# Patient Record
Sex: FEMALE | Race: WHITE | NOT HISPANIC OR LATINO | Employment: STUDENT | URBAN - METROPOLITAN AREA
[De-identification: names, ages, dates, MRNs, and addresses within clinical notes are randomized per-mention and may not be internally consistent; named-entity substitution may affect disease eponyms.]

---

## 2018-03-23 ENCOUNTER — OFFICE VISIT (OUTPATIENT)
Dept: MIDWIFE SERVICES | Facility: CLINIC | Age: 18
End: 2018-03-23
Payer: COMMERCIAL

## 2018-03-23 VITALS
DIASTOLIC BLOOD PRESSURE: 75 MMHG | SYSTOLIC BLOOD PRESSURE: 123 MMHG | HEIGHT: 70 IN | HEART RATE: 68 BPM | OXYGEN SATURATION: 95 % | BODY MASS INDEX: 23.72 KG/M2 | WEIGHT: 165.7 LBS

## 2018-03-23 DIAGNOSIS — N76.0 BV (BACTERIAL VAGINOSIS): ICD-10-CM

## 2018-03-23 DIAGNOSIS — Z11.3 SCREEN FOR STD (SEXUALLY TRANSMITTED DISEASE): Primary | ICD-10-CM

## 2018-03-23 DIAGNOSIS — B96.89 BV (BACTERIAL VAGINOSIS): ICD-10-CM

## 2018-03-23 LAB
SPECIMEN SOURCE: ABNORMAL
WET PREP SPEC: ABNORMAL

## 2018-03-23 PROCEDURE — 87210 SMEAR WET MOUNT SALINE/INK: CPT | Performed by: ADVANCED PRACTICE MIDWIFE

## 2018-03-23 PROCEDURE — 87591 N.GONORRHOEAE DNA AMP PROB: CPT | Performed by: ADVANCED PRACTICE MIDWIFE

## 2018-03-23 PROCEDURE — 99201 ZZC OFFICE/OUTPT VISIT, NEW, LEVEL I: CPT | Performed by: ADVANCED PRACTICE MIDWIFE

## 2018-03-23 PROCEDURE — 87491 CHLMYD TRACH DNA AMP PROBE: CPT | Performed by: ADVANCED PRACTICE MIDWIFE

## 2018-03-23 RX ORDER — METRONIDAZOLE 500 MG/1
500 TABLET ORAL 2 TIMES DAILY
Qty: 14 TABLET | Refills: 0 | Status: SHIPPED | OUTPATIENT
Start: 2018-03-23 | End: 2019-01-17

## 2018-03-23 NOTE — MR AVS SNAPSHOT
"              After Visit Summary   3/23/2018    Taryn Good    MRN: [id number]           Patient Information     Date Of Birth          2000        Visit Information        Provider Department      3/23/2018 3:00 PM Shelly Henderson APRN CNM [facility]        Today's Diagnoses     Screen for STD (sexually transmitted disease)    -  1       Follow-ups after your visit        Who to contact     If you have questions or need follow up information about today's clinic visit or your schedule please contact [facility] directly at [phone].  Normal or non-critical lab and imaging results will be communicated to you by ZipListhart, letter or phone within 4 business days after the clinic has received the results. If you do not hear from us within 7 days, please contact the clinic through ZipListhart or phone. If you have a critical or abnormal lab result, we will notify you by phone as soon as possible.  Submit refill requests through [employer] or call your pharmacy and they will forward the refill request to us. Please allow 3 business days for your refill to be completed.          Additional Information About Your Visit        MyChart Information     [employer] lets you send messages to your doctor, view your test results, renew your prescriptions, schedule appointments and more. To sign up, go to [email or web address] . Click on \"Log in\" on the left side of the screen, which will take you to the Welcome page. Then click on \"Sign up Now\" on the right side of the page.     You will be asked to enter the access code listed below, as well as some personal information. Please follow the directions to create your username and password.     Your access code is: [email or web address]  Expires: 2018  3:53 PM     Your access code will  in 90 days. If you need help or a new code, please call your [facility] or [phone].        Care EveryWhere ID     This is your Care EveryWhere ID. " "This could be used by other organizations to access your [place] medical records  [id number]        Your Vitals Were     Pulse Height Last Period Pulse Oximetry BMI (Body Mass Index)       68 5' 10\" (1.778 m) 02/15/2018 95% 23.78 kg/m2        Blood Pressure from Last 3 Encounters:   03/23/18 123/75    Weight from Last 3 Encounters:   03/23/18 165 lb 11.2 oz (75.2 kg) (90 %)*     * Growth percentiles are based on CDC 2-20 Years data.              We Performed the Following     CHLAMYDIA TRACHOMATIS PCR     NEISSERIA GONORRHOEA PCR     Wet prep        Primary Care Provider Fax #    Physician No Ref-Primary [phone]       No address on file        Equal Access to Services     EMIR GALLARDO : Leyla Jarvis, barbara mena, qaearnest kaalmakayla bain, genaro craig . So [facility] [phone].    ATENCIÓN: Si habla español, tiene a biggs disposición servicios gratuitos de asistencia lingüística. Llame al [phone].    We comply with applicable federal civil rights laws and [place] laws. We do not discriminate on the basis of race, color, national origin, age, disability, sex, sexual orientation, or gender identity.            Thank you!     Thank you for choosing [facility]  for your care. Our goal is always to provide you with excellent care. Hearing back from our patients is one way we can continue to improve our services. Please take a few minutes to complete the written survey that you may receive in the mail after your visit with us. Thank you!             Your Updated Medication List - Protect others around you: Learn how to safely use, store and throw away your medicines at www.disposemymeds.org.          This list is accurate as of 3/23/18  3:53 PM.  Always use your most recent med list.                   Brand Name Dispense Instructions for use Diagnosis    norgestrel-ethinyl estradiol 0.3-30 MG-MCG per tablet    LO/OVRAL     Take 1 tablet by mouth " daily

## 2018-03-23 NOTE — PROGRESS NOTES
"S:  Taryn Good is a 17 year old who presents today for sexually transmitted disease testing. She has no known exposures, just wants testing. Wondering if blood work is covered by insurance, advised to call them to be sure. She declines blood testing today. She would like gonorrhea and chlamydia testing and trichomonas testing. She needed to leave so visit was very short, declined needing anything else or having any other questions. She is currently using pills for birth control and up to date with those.     O:  /75  Pulse 68  Ht 5' 10\" (1.778 m)  Wt 165 lb 11.2 oz (75.2 kg)  LMP 02/15/2018  SpO2 95%  BMI 23.78 kg/m2  Patient is well  Gonorrhea and chlamydia collected via urine   Wet prep was self collect.     A:  Sexually transmitted disease testing, no known exposure     P:  Patient would like results via phone call whether positive or negative. Declined registering for mychart today   Follow up PRN.     Shelly Henderson CNM   "

## 2018-03-23 NOTE — NURSING NOTE
"Chief Complaint   Patient presents with     New Patient     STD       Initial /75  Pulse 68  Ht 5' 10\" (1.778 m)  Wt 165 lb 11.2 oz (75.2 kg)  LMP 02/15/2018  SpO2 95%  BMI 23.78 kg/m2 Estimated body mass index is 23.78 kg/(m^2) as calculated from the following:    Height as of this encounter: 5' 10\" (1.778 m).    Weight as of this encounter: 165 lb 11.2 oz (75.2 kg).  BP completed using cuff size: regular        The following  Due: Chari (13-24)      The following patient reported/Care Every where data was sent to:  P ABSTRACT QUALITY INITIATIVES [75575]  na      patient has appointment for today and orders have been placed              "

## 2018-03-25 LAB
C TRACH DNA SPEC QL NAA+PROBE: NEGATIVE
N GONORRHOEA DNA SPEC QL NAA+PROBE: NEGATIVE
SPECIMEN SOURCE: NORMAL
SPECIMEN SOURCE: NORMAL

## 2018-05-10 NOTE — PROGRESS NOTES
"Taryn is a 17 year old female  that presents today to establish care and discuss her mood swings:   HPI:  History of depression, anxiety and substance abuse:   - one year ago went off Zoloft after being sober for 8 months.  Now over the past three months she has been have severe mood swings where one moment she feels fine and the next moment she is more anxiety, anger and depressed.  She reports feeling scared of her emotions.  One day did feel so down that she felt \"hopeless and life was not worth living.\"  However no suicidal plan or ongoing thoughts of suicide.   PHQ9=13;  GAD7=9;   - Lots of trouble falling asleep. Melatonin has not worked in the past   - Has been sober from ETOH and Mariajuana for two years [went through [facility] program for drug and ETOH  - Has good coping skills and exercises regularly  - Not seeing a therapist  - Goes to meetings but no mentor [she relapsed]  - Best friend overdosed and  [2017]  - First year at [employer] academically has done well.  Loves the distraction of school.   HX: Depression started in 7th grade.Anxiety most of her life. Started on SSRI in 8th grade. Started using ETOH and mariajuana at about the same time.  No suicidal attempts and no psych hospitalizations.   ROS:  General: difficulty sleeping and mood swings   Head/Eyes: none  Ears/Nose/Throat: none  Cardiovascular: none  Respiratory: none  Gastrointestinal: none  Breast: none  Genitourinary: none  Sexual Function: none  Musculoskeletal: none  Skin: under arm sweating   Neurological: none  Mental Health: anxiety, depression, nervousness, mood changes, difficulty sleeping and changes in sleep pattern  Endocrine: none  OB/GYN HISTORY:   On OCP's  Obstetric History       T0      L0     SAB0   TAB0   Ectopic0   Multiple0   Live Births0       PAST MEDICAL HISTORY:  No past medical history on file.  Life Style Modifiers:   Tobacco:  reports that she has been smoking Cigarettes.  She uses " "smokeless tobacco.   Alcohol:  reports that she does not drink alcohol.   Drug use:  reports that she does not use illicit drugs.  Exercise: 5-6 times a week.                   Diet: good  PAST SURGICAL HISTORY:  No past surgical history on file.  FAMILY HISTORY:  Family History   Problem Relation Age of Onset     Depression Mother      Migraines Mother    SOCIAL HISTORY:  Came to [place] in 2016 [from [place]] to go to [employer] [drug and alcohol]. Sober for two years. Moved into a sober house initally and now lives in a dorm.  Close relationship with parents.  Would call them if she needed help.   Social History     Social History     Marital status: Single     Spouse name: N/A     Number of children: N/A     Years of education: N/A     Occupational History     Not on file.     Social History Main Topics     Smoking status: Current Every Day Smoker     Types: Cigarettes     Smokeless tobacco: Current User     Alcohol use No     Drug use: No     Sexual activity: Yes     Partners: Male     Birth control/ protection: Pill   EDICATIONS:  Current Outpatient Prescriptions   Medication Sig Dispense Refill     norgestrel-ethinyl estradiol (LO/OVRAL) 0.3-30 MG-MCG per tablet Take 1 tablet by mouth daily       metroNIDAZOLE (FLAGYL) 500 MG tablet Take 1 tablet (500 mg) by mouth 2 times daily (Patient not taking: Reported on 5/11/2018) 14 tablet 0   ALLERGIES:  Review of patient's allergies indicates no known allergies.  VITALS:  /73  Pulse 67  Temp 98  F (36.7  C) (Oral)  Ht 5' 9.33\" (176.1 cm)  Wt 158 lb (71.7 kg)  LMP 04/29/2018  SpO2 97%  Breastfeeding? No  BMI 23.11 kg/m2  PHYSICAL EXAM:  Well appearing woman in NAD  Speech: regular rate and rhythm  Mood: depressed and tearful at times  Affect: normal  Thought process: appropriate  Psych motor changes: normal/none  Judgement: good  Insight: good   Total of 40 minutes was spent in direct contact with the patient and >50% of the time in patient " education and coordination of care.  Diagnoses and associated orders for this visit:  Moderate episode of recurrent major depressive disorder (H) with mood swings  Hx of drug and ETOH abuse: Sober for two years     Advised a therapist and find a new mentor.  Continue with AA group    Advised that she restart medication: Zoloft 50 mg initially then increase to 100 mg in two weeks [she was one 200 mg in the past]    Follow up in one month or sooner if needed     Discussed plan if depressive symptoms increased [help line and [facility] at [place]]    Mag calm or relaxing tea with good sleep hygiene

## 2018-05-11 ENCOUNTER — OFFICE VISIT (OUTPATIENT)
Dept: FAMILY MEDICINE | Facility: CLINIC | Age: 18
End: 2018-05-11
Payer: COMMERCIAL

## 2018-05-11 ENCOUNTER — TELEPHONE (OUTPATIENT)
Dept: FAMILY MEDICINE | Facility: CLINIC | Age: 18
End: 2018-05-11

## 2018-05-11 VITALS
TEMPERATURE: 98 F | BODY MASS INDEX: 23.4 KG/M2 | DIASTOLIC BLOOD PRESSURE: 73 MMHG | HEART RATE: 67 BPM | SYSTOLIC BLOOD PRESSURE: 117 MMHG | OXYGEN SATURATION: 97 % | HEIGHT: 69 IN | WEIGHT: 158 LBS

## 2018-05-11 DIAGNOSIS — F10.11 H/O ETOH ABUSE: ICD-10-CM

## 2018-05-11 DIAGNOSIS — R61 GENERALIZED HYPERHIDROSIS: ICD-10-CM

## 2018-05-11 DIAGNOSIS — F33.1 MODERATE EPISODE OF RECURRENT MAJOR DEPRESSIVE DISORDER (H): Primary | ICD-10-CM

## 2018-05-11 ASSESSMENT — ANXIETY QUESTIONNAIRES
GAD7 TOTAL SCORE: 9
6. BECOMING EASILY ANNOYED OR IRRITABLE: MORE THAN HALF THE DAYS
1. FEELING NERVOUS, ANXIOUS, OR ON EDGE: SEVERAL DAYS
7. FEELING AFRAID AS IF SOMETHING AWFUL MIGHT HAPPEN: NOT AT ALL
5. BEING SO RESTLESS THAT IT IS HARD TO SIT STILL: SEVERAL DAYS
IF YOU CHECKED OFF ANY PROBLEMS ON THIS QUESTIONNAIRE, HOW DIFFICULT HAVE THESE PROBLEMS MADE IT FOR YOU TO DO YOUR WORK, TAKE CARE OF THINGS AT HOME, OR GET ALONG WITH OTHER PEOPLE: SOMEWHAT DIFFICULT
3. WORRYING TOO MUCH ABOUT DIFFERENT THINGS: MORE THAN HALF THE DAYS
2. NOT BEING ABLE TO STOP OR CONTROL WORRYING: MORE THAN HALF THE DAYS

## 2018-05-11 ASSESSMENT — PATIENT HEALTH QUESTIONNAIRE - PHQ9: 5. POOR APPETITE OR OVEREATING: SEVERAL DAYS

## 2018-05-11 NOTE — NURSING NOTE
"17 year old  Chief Complaint   Patient presents with     Establish Care     pt wants to discuss anxiety and depression       Blood pressure 117/73, pulse 67, temperature 98  F (36.7  C), temperature source Oral, height 5' 9.33\" (176.1 cm), weight 158 lb (71.7 kg), last menstrual period 04/29/2018, SpO2 97 %, not currently breastfeeding. Body mass index is 23.11 kg/(m^2).  There is no problem list on file for this patient.      Wt Readings from Last 2 Encounters:   05/11/18 158 lb (71.7 kg) (86 %)*   03/23/18 165 lb 11.2 oz (75.2 kg) (90 %)*     * Growth percentiles are based on CDC 2-20 Years data.     BP Readings from Last 3 Encounters:   05/11/18 117/73   03/23/18 123/75         Current Outpatient Prescriptions   Medication     norgestrel-ethinyl estradiol (LO/OVRAL) 0.3-30 MG-MCG per tablet     metroNIDAZOLE (FLAGYL) 500 MG tablet     No current facility-administered medications for this visit.        Social History   Substance Use Topics     Smoking status: Current Every Day Smoker     Types: Cigarettes     Smokeless tobacco: Current User     Alcohol use No       Health Maintenance Due   Topic Date Due     PEDS DTAP/TDAP (1 - Tdap) 05/19/2005     HPV IMMUNIZATION (1 of 3 - Female 3 Dose Series) 05/19/2009     TETANUS IMMUNIZATION (SYSTEM ASSIGNED)  05/19/2016     HIV SCREEN (SYSTEM ASSIGNED)  05/19/2016       No results found for: PAP      May 11, 2018 8:01 AM  "

## 2018-05-11 NOTE — MR AVS SNAPSHOT
After Visit Summary   2018    Taryn Good    MRN: [id number]           Patient Information     Date Of Birth          2000        Visit Information        Provider Department      2018 8:00 AM Manisha Charlton MD [facility]        Today's Diagnoses     Moderate episode of recurrent major depressive disorder (H)    -  1    H/O ETOH abuse        Generalized hyperhidrosis           Follow-ups after your visit        Your next 10 appointments already scheduled     Brian 15, 2018  1:40 PM CDT   Return Visit with Manisha Charlton MD   [facility])    [address]  [facility] [address]   [phone]              Who to contact     Please call your clinic at [phone] to:    Ask questions about your health    Make or cancel appointments    Discuss your medicines    Learn about your test results    Speak to your doctor            Additional Information About Your Visit        MyChart Information     [employer] is an electronic gateway that provides easy, online access to your medical records. With [employer], you can request a clinic appointment, read your test results, renew a prescription or communicate with your care team.     To sign up for [facility] visit the website at [email or web address]   You will be asked to enter the access code listed below, as well as some personal information. Please follow the directions to create your username and password.     Your access code is: [email or web address]  Expires: 2018  3:53 PM     Your access code will  in 90 days. If you need help or a new code, please contact your [facility] or call [phone] for assistance.        Care EveryWhere ID     This is your Care EveryWhere ID. This could be used by other organizations to access your [place] medical records  [id number]        Your Vitals Were     Pulse Temperature Height Last  "Period Pulse Oximetry Breastfeeding?    67 98  F (36.7  C) (Oral) 5' 9.33\" (176.1 cm) 04/29/2018 97% No    BMI (Body Mass Index)                   23.11 kg/m2            Blood Pressure from Last 3 Encounters:   05/11/18 117/73   03/23/18 123/75    Weight from Last 3 Encounters:   05/11/18 158 lb (71.7 kg) (86 %)*   03/23/18 165 lb 11.2 oz (75.2 kg) (90 %)*     * Growth percentiles are based on [facility] 2-20 Years data.              Today, you had the following     No orders found for display         Today's Medication Changes          These changes are accurate as of 5/11/18  8:58 AM.  If you have any questions, ask your nurse or doctor.               Start taking these medicines.        Dose/Directions    aluminum chloride 20 % external solution   Commonly known as:  DRYSOL   Used for:  Generalized hyperhidrosis   Started by:  Manisha Charlton MD        Apply topically At Bedtime To improve effect, cover area of application with plastic wrap,  hold in place with tight shirt, and wash area in morning. As sweating improves, decrease use to 1-2 times weekly.   Quantity:  60 mL   Refills:  1       sertraline 50 MG tablet   Commonly known as:  ZOLOFT   Used for:  Moderate episode of recurrent major depressive disorder (H)   Started by:  Manisha Charlton MD        Start with one tablet for two weeks then increase to 2 tablets = 100 mg   Quantity:  60 tablet   Refills:  1            Where to get your medicines      These medications were sent to [facility] [place], [place] - [address]  [address] [address]     Phone:  [phone]     sertraline 50 MG tablet         Some of these will need a paper prescription and others can be bought over the counter.  Ask your nurse if you have questions.     Bring a paper prescription for each of these medications     aluminum chloride 20 % external solution                Primary Care Provider Fax #    Physician No Ref-Primary " [phone]       No address on file        Equal Access to Services     EMIR PAULINA : Hadii orquidea chavez nader Jarvis, barbara sakshiboston, karma cerna mary janejanet, waxtimur yesseniain hayaatomasa hintonjillian mcginnisramon martinez. So [facility] [phone].    ATENCIÓN: Si habla español, tiene a biggs disposición servicios gratuitos de asistencia lingüística. [facility] [phone].    We comply with applicable federal civil rights laws and [place] laws. We do not discriminate on the basis of race, color, national origin, age, disability, sex, sexual orientation, or gender identity.            Thank you!     Thank you for choosing [facility]  for your care. Our goal is always to provide you with excellent care. Hearing back from our patients is one way we can continue to improve our services. Please take a few minutes to complete the written survey that you may receive in the mail after your visit with us. Thank you!             Your Updated Medication List - Protect others around you: Learn how to safely use, store and throw away your medicines at www.disposemymeds.org.          This list is accurate as of 5/11/18  8:58 AM.  Always use your most recent med list.                   Brand Name Dispense Instructions for use Diagnosis    aluminum chloride 20 % external solution    DRYSOL    60 mL    Apply topically At Bedtime To improve effect, cover area of application with plastic wrap,  hold in place with tight shirt, and wash area in morning. As sweating improves, decrease use to 1-2 times weekly.    Generalized hyperhidrosis       metroNIDAZOLE 500 MG tablet    FLAGYL    14 tablet    Take 1 tablet (500 mg) by mouth 2 times daily    BV (bacterial vaginosis)       norgestrel-ethinyl estradiol 0.3-30 MG-MCG per tablet    LO/OVRAL     Take 1 tablet by mouth daily        sertraline 50 MG tablet    ZOLOFT    60 tablet    Start with one tablet for two weeks then increase to 2 tablets = 100 mg    Moderate episode of recurrent major depressive  disorder (H)

## 2018-05-12 ASSESSMENT — ANXIETY QUESTIONNAIRES: GAD7 TOTAL SCORE: 9

## 2018-05-12 ASSESSMENT — PATIENT HEALTH QUESTIONNAIRE - PHQ9: SUM OF ALL RESPONSES TO PHQ QUESTIONS 1-9: 13

## 2018-06-14 NOTE — PROGRESS NOTES
"Taryn is a 18 year old female  that presents today for follow-up:  Last seen 2018  HPI:  History of depression, anxiety and substance abuse:   - Remain Sober for > two years.  Attends  two meeting a week.   - Restarted on Zoloft one month ago.   Now on Zoloft 100 mg and feels stable    Most of the time \"I feel good/normal\"  \" feel myself\"    Not working with a therapist at this time    Working full time [ at a [employer] - long hours].  Having a schedule helps and she enjoys her job     Lives on [place] [facility] [sober dorm] will return to classes in the fall [sophomore year]    Does worry with the winter/shorter days and how it will affect her mood.   PHQ9=6;  GAD7=1;   HX: Depression started in 7th grade. Anxiety most of her life. Started on SSRI in 8th grade. Started using ETOH and mariajuana at about the same time.  No suicidal attempts and no psych hospitalizations.   ROS:  General: sleep is good.  Feeling stable.    Head/Eyes: none  Ears/Nose/Throat: none  Cardiovascular: none  Respiratory: none  Gastrointestinal: none  Breast: none  Genitourinary: none  Sexual Function: none  Musculoskeletal: none  Skin: under arm sweating   Neurological: none  Mental Health: anxiety, depression, nervousness, mood changes, difficulty sleeping and changes in sleep pattern  Endocrine: none  OB/GYN HISTORY:   On OCP's  Obstetric History       T0      L0     SAB0   TAB0   Ectopic0   Multiple0   Live Births0       PAST MEDICAL HISTORY:  Past Medical History:   Diagnosis Date     Depression      H/O ETOH abuse      Successful prior treatment for illicit drug use      Life Style Modifiers:   Tobacco:  reports that she has been smoking Cigarettes.  She uses smokeless tobacco.   Alcohol:  reports that she does not drink alcohol.   Drug use:  reports that she does not use illicit drugs.  Exercise: 5-6 times a week.                   Diet: good  PAST SURGICAL HISTORY:  No past surgical history on " "file.  FAMILY HISTORY:  Family History   Problem Relation Age of Onset     Depression Mother      Migraines Mother    SOCIAL HISTORY:  Came to [place] in 2016 [from [place]] to go to [employer] program [drug and alcohol]. Sober for + two years.   [facility]  Close relationship with parents.  Would call them if she needed help.   Social History     Social History     Marital status: Single     Spouse name: N/A     Number of children: N/A     Years of education: N/A     Occupational History     Not on file.     Social History Main Topics     Smoking status: Current Every Day Smoker     Types: Cigarettes     Smokeless tobacco: Current User     Alcohol use No     Drug use: No     Sexual activity: Yes     Partners: Male     Birth control/ protection: Pill   EDICATIONS:  Current Outpatient Prescriptions   Medication Sig Dispense Refill     aluminum chloride (DRYSOL) 20 % external solution Apply topically At Bedtime To improve effect, cover area of application with plastic wrap,  hold in place with tight shirt, and wash area in morning. As sweating improves, decrease use to 1-2 times weekly. 60 mL 1     metroNIDAZOLE (FLAGYL) 500 MG tablet Take 1 tablet (500 mg) by mouth 2 times daily (Patient not taking: Reported on 5/11/2018) 14 tablet 0     norgestrel-ethinyl estradiol (LO/OVRAL) 0.3-30 MG-MCG per tablet Take 1 tablet by mouth daily       sertraline (ZOLOFT) 50 MG tablet Start with one tablet for two weeks then increase to 2 tablets = 100 mg 60 tablet 1   ALLERGIES:  Review of patient's allergies indicates no known allergies.  VITALS:  /78 (BP Location: Right arm, Patient Position: Chair, Cuff Size: Adult Regular)  Pulse 82  Temp 98.1  F (36.7  C) (Oral)  Ht 1.758 m (5' 9.21\")  Wt 70.8 kg (156 lb)  SpO2 100%  BMI 22.9 kg/m2  PHYSICAL EXAM:  Well appearing woman in NAD  Speech: regular rate and rhythm  Mood:excellent    Affect: normal  Thought process: " appropriate  Psych motor changes: normal/none  Judgement: good  Insight: good   Total of 40 minutes was spent in direct contact with the patient and >50% of the time in patient education and coordination of care.  Diagnoses and associated orders for this visit:  Moderate episode of recurrent major depressive disorder (H)  -     sertraline (ZOLOFT) 100 MG tablet; Take 1 tablet (100 mg) by mouth daily  Advised Light therapy [options discussed]  Stay on Zoloft and maybe increase the dose for the winter.  RX sent  Mid-February take a vacation

## 2018-06-15 ENCOUNTER — OFFICE VISIT (OUTPATIENT)
Dept: FAMILY MEDICINE | Facility: CLINIC | Age: 18
End: 2018-06-15
Payer: COMMERCIAL

## 2018-06-15 VITALS
DIASTOLIC BLOOD PRESSURE: 78 MMHG | BODY MASS INDEX: 23.11 KG/M2 | SYSTOLIC BLOOD PRESSURE: 124 MMHG | TEMPERATURE: 98.1 F | OXYGEN SATURATION: 100 % | HEIGHT: 69 IN | HEART RATE: 82 BPM | WEIGHT: 156 LBS

## 2018-06-15 DIAGNOSIS — F33.1 MODERATE EPISODE OF RECURRENT MAJOR DEPRESSIVE DISORDER (H): Primary | ICD-10-CM

## 2018-06-15 RX ORDER — SERTRALINE HYDROCHLORIDE 100 MG/1
100 TABLET, FILM COATED ORAL DAILY
Qty: 90 TABLET | Refills: 2 | Status: SHIPPED | OUTPATIENT
Start: 2018-06-15 | End: 2019-01-17

## 2018-06-15 ASSESSMENT — PATIENT HEALTH QUESTIONNAIRE - PHQ9: 5. POOR APPETITE OR OVEREATING: NOT AT ALL

## 2018-06-15 ASSESSMENT — ANXIETY QUESTIONNAIRES
IF YOU CHECKED OFF ANY PROBLEMS ON THIS QUESTIONNAIRE, HOW DIFFICULT HAVE THESE PROBLEMS MADE IT FOR YOU TO DO YOUR WORK, TAKE CARE OF THINGS AT HOME, OR GET ALONG WITH OTHER PEOPLE: SOMEWHAT DIFFICULT
7. FEELING AFRAID AS IF SOMETHING AWFUL MIGHT HAPPEN: NOT AT ALL
5. BEING SO RESTLESS THAT IT IS HARD TO SIT STILL: NOT AT ALL
1. FEELING NERVOUS, ANXIOUS, OR ON EDGE: NOT AT ALL
3. WORRYING TOO MUCH ABOUT DIFFERENT THINGS: NOT AT ALL
6. BECOMING EASILY ANNOYED OR IRRITABLE: SEVERAL DAYS
2. NOT BEING ABLE TO STOP OR CONTROL WORRYING: NOT AT ALL
GAD7 TOTAL SCORE: 1

## 2018-06-15 ASSESSMENT — PAIN SCALES - GENERAL: PAINLEVEL: NO PAIN (0)

## 2018-06-15 NOTE — MR AVS SNAPSHOT
"              After Visit Summary   6/15/2018    Taryn Good    MRN: [id number]           Patient Information     Date Of Birth          2000        Visit Information        Provider Department      6/15/2018 1:40 PM Manisha Charlton MD [facility]        Today's Diagnoses     Moderate episode of recurrent major depressive disorder (H)    -  1       Follow-ups after your visit        Who to contact     Please call your clinic at [phone] to:    Ask questions about your health    Make or cancel appointments    Discuss your medicines    Learn about your test results    Speak to your doctor            Additional Information About Your Visit        MyChart Information     Fraxion is an electronic gateway that provides easy, online access to your medical records. With Fraxion, you can request a clinic appointment, read your test results, renew a prescription or communicate with your care team.     To sign up for Fraxion visit the website at [email or web address]   You will be asked to enter the access code listed below, as well as some personal information. Please follow the directions to create your username and password.     Your access code is: [email or web address]  Expires: 2018  3:53 PM     Your access code will  in 90 days. If you need help or a new code, please contact your [facility] or call [phone] for assistance.        Care EveryWhere ID     This is your Care EveryWhere ID. This could be used by other organizations to access your [place] medical records  [id number]        Your Vitals Were     Pulse Temperature Height Pulse Oximetry BMI (Body Mass Index)       82 98.1  F (36.7  C) (Oral) 5' 9.21\" (175.8 cm) 100% 22.9 kg/m2        Blood Pressure from Last 3 Encounters:   06/15/18 124/78   18 117/73   18 123/75    Weight from Last 3 Encounters:   06/15/18 156 lb (70.8 kg)   18 158 lb (71.7 kg) (86 %)*   18 165 lb 11.2 oz " (75.2 kg) (90 %)*     * Growth percentiles are based on [facility] 2-20 Years data.              Today, you had the following     No orders found for display         Today's Medication Changes          These changes are accurate as of 6/15/18  2:04 PM.  If you have any questions, ask your nurse or doctor.               These medicines have changed or have updated prescriptions.        Dose/Directions    * sertraline 50 MG tablet   Commonly known as:  ZOLOFT   This may have changed:  Another medication with the same name was added. Make sure you understand how and when to take each.   Used for:  Moderate episode of recurrent major depressive disorder (H)   Changed by:  Manisha Charlton MD        Start with one tablet for two weeks then increase to 2 tablets = 100 mg   Quantity:  60 tablet   Refills:  1       * sertraline 100 MG tablet   Commonly known as:  ZOLOFT   This may have changed:  You were already taking a medication with the same name, and this prescription was added. Make sure you understand how and when to take each.   Used for:  Moderate episode of recurrent major depressive disorder (H)   Changed by:  Manisha Charlton MD        Dose:  100 mg   Take 1 tablet (100 mg) by mouth daily   Quantity:  90 tablet   Refills:  2       * Notice:  This list has 2 medication(s) that are the same as other medications prescribed for you. Read the directions carefully, and ask your doctor or other care provider to review them with you.         Where to get your medicines      These medications were sent to [facility] [place], [place] - [address]  [address] [facility] [address], [facility] [address]     Phone:  [phone]     sertraline 100 MG tablet                Primary Care Provider Fax #    Physician No Ref-Primary [phone]       No address on file        Equal Access to Services     EMIR GALLARDO AH: Leyla Jarvis, barbara mena, genaro stack  christianebeatriceramon craig ah. So [facility] [phone].    ATENCIÓN: Si effie lemon, tiene a biggs disposición servicios gratuitos de asistencia lingüística. Pat simpson [phone].    We comply with applicable federal civil rights laws and [place] laws. We do not discriminate on the basis of race, color, national origin, age, disability, sex, sexual orientation, or gender identity.            Thank you!     Thank you for choosing [facility]  for your care. Our goal is always to provide you with excellent care. Hearing back from our patients is one way we can continue to improve our services. Please take a few minutes to complete the written survey that you may receive in the mail after your visit with us. Thank you!             Your Updated Medication List - Protect others around you: Learn how to safely use, store and throw away your medicines at www.disposemymeds.org.          This list is accurate as of 6/15/18  2:04 PM.  Always use your most recent med list.                   Brand Name Dispense Instructions for use Diagnosis    aluminum chloride 20 % external solution    DRYSOL    60 mL    Apply topically At Bedtime To improve effect, cover area of application with plastic wrap,  hold in place with tight shirt, and wash area in morning. As sweating improves, decrease use to 1-2 times weekly.    Generalized hyperhidrosis       metroNIDAZOLE 500 MG tablet    FLAGYL    14 tablet    Take 1 tablet (500 mg) by mouth 2 times daily    BV (bacterial vaginosis)       norgestrel-ethinyl estradiol 0.3-30 MG-MCG per tablet    LO/OVRAL     Take 1 tablet by mouth daily        * sertraline 50 MG tablet    ZOLOFT    60 tablet    Start with one tablet for two weeks then increase to 2 tablets = 100 mg    Moderate episode of recurrent major depressive disorder (H)       * sertraline 100 MG tablet    ZOLOFT    90 tablet    Take 1 tablet (100 mg) by mouth daily    Moderate episode of recurrent major depressive disorder (H)       * Notice:   This list has 2 medication(s) that are the same as other medications prescribed for you. Read the directions carefully, and ask your doctor or other care provider to review them with you.

## 2018-06-15 NOTE — NURSING NOTE
"18 year old  Chief Complaint   Patient presents with     RECHECK     1 Mo F/U starting Zoloft. Pt reports she is feeling better and the medication seems to be going well.       Blood pressure 124/78, pulse 82, temperature 98.1  F (36.7  C), temperature source Oral, height 5' 9.21\" (175.8 cm), weight 156 lb (70.8 kg), SpO2 100 %, not currently breastfeeding. Body mass index is 22.9 kg/(m^2).  Patient Active Problem List   Diagnosis     H/O ETOH abuse     Moderate episode of recurrent major depressive disorder (H)       Wt Readings from Last 2 Encounters:   06/15/18 156 lb (70.8 kg)   05/11/18 158 lb (71.7 kg) (86 %)*     * Growth percentiles are based on CDC 2-20 Years data.     BP Readings from Last 3 Encounters:   06/15/18 124/78   05/11/18 117/73   03/23/18 123/75         Current Outpatient Prescriptions   Medication     aluminum chloride (DRYSOL) 20 % external solution     metroNIDAZOLE (FLAGYL) 500 MG tablet     norgestrel-ethinyl estradiol (LO/OVRAL) 0.3-30 MG-MCG per tablet     sertraline (ZOLOFT) 50 MG tablet     No current facility-administered medications for this visit.        Social History   Substance Use Topics     Smoking status: Current Every Day Smoker     Types: Cigarettes     Smokeless tobacco: Current User     Alcohol use No       Health Maintenance Due   Topic Date Due     PEDS DTAP/TDAP (1 - Tdap) 05/19/2005     HPV IMMUNIZATION (1 of 3 - Female 3 Dose Series) 05/19/2009     TETANUS IMMUNIZATION (SYSTEM ASSIGNED)  05/19/2016     DEPRESSION ACTION PLAN Q1 YR  05/19/2016     HIV SCREEN (SYSTEM ASSIGNED)  05/19/2016       No results found for: STEPHEN Muse MA  Natalie 15, 2018 1:43 PM    "

## 2018-06-16 ASSESSMENT — PATIENT HEALTH QUESTIONNAIRE - PHQ9: SUM OF ALL RESPONSES TO PHQ QUESTIONS 1-9: 6

## 2018-06-16 ASSESSMENT — ANXIETY QUESTIONNAIRES: GAD7 TOTAL SCORE: 1

## 2018-08-08 ENCOUNTER — OFFICE VISIT (OUTPATIENT)
Dept: FAMILY MEDICINE | Facility: CLINIC | Age: 18
End: 2018-08-08
Payer: COMMERCIAL

## 2018-08-08 VITALS
OXYGEN SATURATION: 97 % | SYSTOLIC BLOOD PRESSURE: 128 MMHG | BODY MASS INDEX: 22.7 KG/M2 | DIASTOLIC BLOOD PRESSURE: 89 MMHG | HEART RATE: 98 BPM | HEIGHT: 69 IN | TEMPERATURE: 98.2 F | WEIGHT: 153.25 LBS

## 2018-08-08 DIAGNOSIS — R10.13 ABDOMINAL PAIN, EPIGASTRIC: ICD-10-CM

## 2018-08-08 DIAGNOSIS — R53.83 FATIGUE, UNSPECIFIED TYPE: ICD-10-CM

## 2018-08-08 DIAGNOSIS — R35.0 URINARY FREQUENCY: Primary | ICD-10-CM

## 2018-08-08 LAB
ALBUMIN SERPL-MCNC: 3.6 G/DL (ref 3.3–4.6)
ALP SERPL-CCNC: 57 U/L (ref 40–150)
ALT SERPL-CCNC: 16 U/L (ref 0–50)
AST SERPL-CCNC: 23 U/L (ref 0–45)
BASOPHILS # BLD AUTO: 0 10E9/L (ref 0–0.2)
BASOPHILS NFR BLD AUTO: 0.5 %
BILIRUB SERPL-MCNC: 0.6 MG/DL (ref 0.2–1.3)
BILIRUBIN UR: NEGATIVE
BLOOD UR: NEGATIVE
BUN SERPL-MCNC: 10 MG/DL (ref 5–24)
CALCIUM SERPL-MCNC: 9.4 MG/DL (ref 8.5–10.4)
CHLORIDE SERPLBLD-SCNC: 101 MMOL/L (ref 94–109)
CO2 SERPL-SCNC: 26 MMOL/L (ref 20–32)
CREAT SERPL-MCNC: 0.8 MG/DL (ref 0.6–1.3)
DIFFERENTIAL METHOD BLD: NORMAL
EGFR CALCULATED (BLACK REFERENCE): 117.6
EGFR CALCULATED (NON BLACK REFERENCE): 97.2
EOSINOPHIL # BLD AUTO: 0.2 10E9/L (ref 0–0.7)
EOSINOPHIL NFR BLD AUTO: 3.4 %
ERYTHROCYTE [DISTWIDTH] IN BLOOD BY AUTOMATED COUNT: 13.5 % (ref 10–15)
GLUCOSE SERPL-MCNC: 95 MG/DL (ref 60–109)
GLUCOSE URINE: NEGATIVE
HCG UR QL: NEGATIVE
HCT VFR BLD AUTO: 40.4 % (ref 35–47)
HGB BLD-MCNC: 13.4 G/DL (ref 11.7–15.7)
IMM GRANULOCYTES # BLD: 0 10E9/L (ref 0–0.4)
IMM GRANULOCYTES NFR BLD: 0.2 %
KETONES UR QL: NEGATIVE
LEUKOCYTE ESTERASE UR: NEGATIVE
LIPASE SERPL-CCNC: 101 U/L (ref 73–393)
LYMPHOCYTES # BLD AUTO: 2.6 10E9/L (ref 0.8–5.3)
LYMPHOCYTES NFR BLD AUTO: 40.2 %
MCH RBC QN AUTO: 29 PG (ref 26.5–33)
MCHC RBC AUTO-ENTMCNC: 33.2 G/DL (ref 31.5–36.5)
MCV RBC AUTO: 87 FL (ref 78–100)
MONOCYTES # BLD AUTO: 0.5 10E9/L (ref 0–1.3)
MONOCYTES NFR BLD AUTO: 7 %
NEUTROPHILS # BLD AUTO: 3.1 10E9/L (ref 1.6–8.3)
NEUTROPHILS NFR BLD AUTO: 48.7 %
NITRITE UR QL STRIP: NEGATIVE
NRBC # BLD AUTO: 0 10*3/UL
NRBC BLD AUTO-RTO: 0 /100
PH UR STRIP: 7 [PH] (ref 5–7)
PLATELET # BLD AUTO: 272 10E9/L (ref 150–450)
POTASSIUM SERPL-SCNC: 4.4 MMOL/L (ref 3.4–5.3)
PROT SERPL-MCNC: 7.4 G/DL (ref 6.8–8.8)
PROTEIN UR: NEGATIVE
RBC # BLD AUTO: 4.62 10E12/L (ref 3.8–5.2)
SODIUM SERPL-SCNC: 137 MMOL/L (ref 133–144)
SP GR UR STRIP: 1.02
TSH SERPL DL<=0.005 MIU/L-ACNC: 0.94 MU/L (ref 0.4–4)
UROBILINOGEN UR STRIP-ACNC: NORMAL
WBC # BLD AUTO: 6.4 10E9/L (ref 4–11)

## 2018-08-08 ASSESSMENT — PAIN SCALES - GENERAL: PAINLEVEL: SEVERE PAIN (6)

## 2018-08-08 NOTE — NURSING NOTE
"18 year old  Chief Complaint   Patient presents with     Bladder Problems     Pt thinks possible kidney infection. She had a kidney infection when she was 15 and the Sx's are the same.  Low back pain, nautious, feeling very hot, no appetite, and extreme fatigue for the last 3 days.        Blood pressure 128/89, pulse 98, temperature 98.2  F (36.8  C), temperature source Oral, height 5' 9.21\" (175.8 cm), weight 153 lb 4 oz (69.5 kg), last menstrual period 07/20/2018, SpO2 97 %, not currently breastfeeding. Body mass index is 22.49 kg/(m^2).  Patient Active Problem List   Diagnosis     H/O ETOH abuse     Moderate episode of recurrent major depressive disorder (H)       Wt Readings from Last 2 Encounters:   08/08/18 153 lb 4 oz (69.5 kg)   06/15/18 156 lb (70.8 kg)     BP Readings from Last 3 Encounters:   08/08/18 128/89   06/15/18 124/78   05/11/18 117/73         Current Outpatient Prescriptions   Medication     aluminum chloride (DRYSOL) 20 % external solution     norgestrel-ethinyl estradiol (LO/OVRAL) 0.3-30 MG-MCG per tablet     sertraline (ZOLOFT) 100 MG tablet     metroNIDAZOLE (FLAGYL) 500 MG tablet     sertraline (ZOLOFT) 50 MG tablet     No current facility-administered medications for this visit.        Social History   Substance Use Topics     Smoking status: Current Every Day Smoker     Types: Cigarettes     Smokeless tobacco: Current User     Alcohol use No       Health Maintenance Due   Topic Date Due     PEDS DTAP/TDAP (1 - Tdap) 05/19/2005     HPV IMMUNIZATION (1 of 3 - Female 3 Dose Series) 05/19/2009     TETANUS IMMUNIZATION (SYSTEM ASSIGNED)  05/19/2016     DEPRESSION ACTION PLAN Q1 YR  05/19/2016     HIV SCREEN (SYSTEM ASSIGNED)  05/19/2016       No results found for: STEPHEN Muse MA  August 8, 2018 1:51 PM    "

## 2018-08-08 NOTE — MR AVS SNAPSHOT
"              After Visit Summary   2018    Taryn Good    MRN: [id number]           Patient Information     Date Of Birth          2000        Visit Information        Provider Department      2018 1:40 PM Dell Sanchez MD [facility]        Today's Diagnoses     Urinary frequency    -  1    Fatigue, unspecified type        Abdominal pain, epigastric          Care Instructions    17 yo with urinary frequency, fatigue and low back pain for 2 days.   Preliminary labs are normal    Plan:  Await CBC, Thyroid studies and Lipase (for Pancreatic function).   Also, await Urine Culture. That should be back within 48 hours.     Hydration may help with the borderline tachycardia  Try Probiotics daily  Sign up for the \"[employer]\" system to obtain lab results.   Follow up on Monday, Aug 13 if no better. Return sooner if worse.            Follow-ups after your visit        Who to contact     Please call your clinic at [phone] to:    Ask questions about your health    Make or cancel appointments    Discuss your medicines    Learn about your test results    Speak to your doctor            Additional Information About Your Visit        [employer] Information     [employer] is an electronic gateway that provides easy, online access to your medical records. With [employer], you can request a clinic appointment, read your test results, renew a prescription or communicate with your care team.     To sign up for [employer] visit the website at [email or web address]   You will be asked to enter the access code listed below, as well as some personal information. Please follow the directions to create your username and password.     Your access code is: [email or web address]  Expires: 2018  2:52 PM     Your access code will  in 90 days. If you need help or a new code, please contact your [facility] or call [phone] for assistance.        Care EveryWhere ID     This is your " "Care EveryWhere ID. This could be used by other organizations to access your [place] medical records  [id number]        Your Vitals Were     Pulse Temperature Height Last Period Pulse Oximetry BMI (Body Mass Index)    98 98.2  F (36.8  C) (Oral) 5' 9.21\" (175.8 cm) 07/20/2018 (Within Days) 97% 22.49 kg/m2       Blood Pressure from Last 3 Encounters:   08/08/18 128/89   06/15/18 124/78   05/11/18 117/73    Weight from Last 3 Encounters:   08/08/18 153 lb 4 oz (69.5 kg)   06/15/18 156 lb (70.8 kg)   05/11/18 158 lb (71.7 kg) (86 %)*     * Growth percentiles are based on [facility] 2-20 Years data.              We Performed the Following     CBC with platelets differential     Comprehensive Metabolic Panel ([place])     HCG Qualitative Urine ([place])     Lipase     TSH with free T4 reflex     Urinalysis ([place])     Urine Culture Aerobic Bacterial        Primary Care Provider Fax #    Physician No Ref-Primary [phone]       No address on file        Equal Access to Services     EMIR GALLARDO : Hadii orquidea Jarvis, waclaudiada trina, qaybta kaalmakayla bain, genaro craig . So [facility] [phone].    ATENCIÓN: Si habla español, tiene a biggs disposición servicios gratuitos de asistencia lingüística. Llame al [phone].    We comply with applicable federal civil rights laws and [place] laws. We do not discriminate on the basis of race, color, national origin, age, disability, sex, sexual orientation, or gender identity.            Thank you!     Thank you for choosing [facility]  for your care. Our goal is always to provide you with excellent care. Hearing back from our patients is one way we can continue to improve our services. Please take a few minutes to complete the written survey that you may receive in the mail after your visit with us. Thank you!             Your Updated Medication List - Protect others around you: Learn how to safely use, store and throw away " your medicines at www.disposemymeds.org.          This list is accurate as of 8/8/18  2:52 PM.  Always use your most recent med list.                   Brand Name Dispense Instructions for use Diagnosis    aluminum chloride 20 % external solution    DRYSOL    60 mL    Apply topically At Bedtime To improve effect, cover area of application with plastic wrap,  hold in place with tight shirt, and wash area in morning. As sweating improves, decrease use to 1-2 times weekly.    Generalized hyperhidrosis       metroNIDAZOLE 500 MG tablet    FLAGYL    14 tablet    Take 1 tablet (500 mg) by mouth 2 times daily    BV (bacterial vaginosis)       norgestrel-ethinyl estradiol 0.3-30 MG-MCG per tablet    LO/OVRAL     Take 1 tablet by mouth daily        * sertraline 50 MG tablet    ZOLOFT    60 tablet    Start with one tablet for two weeks then increase to 2 tablets = 100 mg    Moderate episode of recurrent major depressive disorder (H)       * sertraline 100 MG tablet    ZOLOFT    90 tablet    Take 1 tablet (100 mg) by mouth daily    Moderate episode of recurrent major depressive disorder (H)       * Notice:  This list has 2 medication(s) that are the same as other medications prescribed for you. Read the directions carefully, and ask your doctor or other care provider to review them with you.

## 2018-08-08 NOTE — PATIENT INSTRUCTIONS
"17 yo with urinary frequency, fatigue and low back pain for 2 days.   Preliminary labs are normal    Plan:  Await CBC, Thyroid studies and Lipase (for Pancreatic function).   Also, await Urine Culture. That should be back within 48 hours.     Hydration may help with the borderline tachycardia  Try Probiotics daily  Sign up for the \"[employer]\" system to obtain lab results.   Follow up on Monday, Aug 13 if no better. Return sooner if worse.    "

## 2018-08-08 NOTE — PROGRESS NOTES
"REASON FOR VISIT:  Possible kidney infection      HISTORY OF PRESENT ILLNESS: Taryn Good is a 18 year old female with a history of anxiety, depression, and substance abuse who presents for a possible kidney infection. She developed stomach pain 2 days ago (8/6) early in the morning, and she felt fatigued. The stomach pain and fatigue continued all day and she also had some urinary frequency. No dysuria. Yesterday (8/7), those symptoms continued and she developed back and flank pain with any movement. The pain has not stopped since then. She has been treating the pain with heating packs. She also reports nausea and decreased appetite. She did eat a normal breakfast this morning. Subjective fever, but she has not measured her temperature. She has lost 3 pounds since her last visit 2 months ago on 6/15. Her breakfast was yogurt with granola and some coffee. She did not eat lunch because she has been sleeping so much. She drinks a lot of water normally, and while she has been sick.     She has been sober from drugs and alcohol for almost 2 years. LMP ended about 2 weeks ago on 7/25, she is on birth control pills. She takes Zoloft 100mg daily. She does not take probiotics. She has not been exercising recently due to her work schedule.        SOCIAL HISTORY: Student at [employer]. She used to work at the [employer] in [facility], but she stopped work recently to prepare for classes starting. She went to [employer] in [place] and moved here for substance abuse treatment.      MEDICATIONS:  Carefully reviewed.       REVIEW OF SYSTEMS:  POSITIVE for abdominal pain, back/flank pain, urinary frequency, nausea, decreased appetite, fatigue, subjective fever.      OBJECTIVE:      EXAM:  Taryn is a tall, lean 19 yo female.  VITAL SIGNS: /89 (BP Location: Right arm, Patient Position: Chair, Cuff Size: Adult Regular)  Pulse 98  Temp 98.2  F (36.8  C) (Oral)  Ht 5' 9.21\" (175.8 cm)  Wt 153 lb 4 oz (69.5 kg) "  LMP 07/20/2018 (Within Days)  SpO2 97%  BMI 22.49 kg/m2  Constitutional: healthy, alert and no distress   HEENT - normal  Cardiovascular: Borderline tachycardic at 100bpm, regular rhythm. No murmurs  Respiratory: Lungs clear to auscultation bilaterally.  Abdomen: Abdomen soft, non-tender. BS normal. No masses or organomegaly.  Back: Thoracic and lower lumbar pain. Normal forward flexion.        LABORATORY DATA:  Results for orders placed or performed in visit on 08/08/18   Urinalysis ([place])   Result Value Ref Range    Specific Gravity Urine 1.020 1.005 - 1.030    pH Urine 7.0 4.5 - 8.0    Leukocyte Esterase UR Negative Negative    Nitrite Urine Negative Negative    Protein UR Negative Negative    Glucose Urine Negative Negative    Ketones Urine Negative Negative    Urobilinogen mg/dL 0.2 E.U./dL 0.2 E.U./dL    Bilirubin UR Negative Negative    Blood UR Negative Negative   HCG Qualitative Urine ([place])   Result Value Ref Range    HCG Qual Urine NEGATIVE Negative   Comprehensive Metabolic Panel ([place])   Result Value Ref Range    Glucose 95.0 60.0 - 109.0 mg/dL    Urea Nitrogen 10.0 5.0 - 24.0 mg/dL    Calcium 9.4 8.5 - 10.4 mg/dL    Creatinine 0.8 0.6 - 1.3 mg/dL    eGFR Calculated (Non Black Reference) 97.2 >60.0    eGFR Calculated (Black Reference) 117.6 >60.0    Sodium 137.0 133.0 - 144.0 mmol/L    Potassium 4.4 3.4 - 5.3 mmol/L    Chloride 101.0 94.0 - 109.0 mmol/L    Carbon Dioxide 26.0 20.0 - 32.0 mmol/L    Albumin 3.6 3.3 - 4.6 g/dL    Alkaline Phosphatase 57.0 40.0 - 150.0 U/L    ALT 16.0 0.0 - 50.0 U/L    AST 23.0 0.0 - 45.0 U/L    Bilirubin Total 0.6 0.2 - 1.3 mg/dL    Protein Total 7.4 6.8 - 8.8 g/dL           ASSESSMENT AND PLAN:   19 yo with urinary frequency, fatigue and low back pain for 2 days.   Preliminary labs are normal    Plan:  Await CBC, Thyroid studies and Lipase (for Pancreatic function).   Also, await Urine Culture. That should be back within 48 hours.     Hydration may help  "with the borderline tachycardia  Try Probiotics daily  Sign up for the \"MyChart\" system to obtain lab results.   Follow up on Monday, Aug 13 if no better. Return sooner if worse.          Call with any problems or questions.          I, Marifer Buchanan, am serving as a scribe to document services personally performed by Dr. Dell Sanchez, based on data collection and the provider's statements to me.   --Dell Sanchez MD      "

## 2018-08-09 LAB
BACTERIA SPEC CULT: NO GROWTH
SPECIMEN SOURCE: NORMAL

## 2018-08-15 ENCOUNTER — HEALTH MAINTENANCE LETTER (OUTPATIENT)
Age: 18
End: 2018-08-15

## 2018-09-20 NOTE — PROGRESS NOTES
Taryn is a 18 year old female  that presents today for follow-up to discuss depression.  Last seen 6.15.2018.  Thinks an emotional support animal [cat] would be helpful.  Brings the paper work needed for [facility] where the cat would be housed.    HPI:  History of depression, anxiety and substance abuse:   - Remain Sober for > two years.  Attends  two meeting a week.   - Now on Zoloft 100 mg and thinks she may need to increase her dose for the winter. Has periods of loneliness where an animal would help with mood and comfort.    - Sees counselor weekly at [facility]     HX: Depression started in 7th grade. Anxiety most of her life. Started on SSRI in 8th grade. Started using ETOH and mariajuana at about the same time.  No suicidal attempts and no psych hospitalizations.   ROS:  General: sleep is good.   Head/Eyes: none  Ears/Nose/Throat: none  Cardiovascular: none  Respiratory: none  Gastrointestinal: none  Breast: none  Genitourinary: none  Sexual Function: none  Musculoskeletal: none  Skin: under arm sweating   Neurological: none  Mental Health: anxiety, depression, nervousness, mood changes, difficulty sleeping and changes in sleep pattern  Endocrine: none  OB/GYN HISTORY:   On OCP's  Obstetric History       T0      L0     SAB0   TAB0   Ectopic0   Multiple0   Live Births0       PAST MEDICAL HISTORY:  Past Medical History:   Diagnosis Date     Depression      H/O ETOH abuse      Successful prior treatment for illicit drug use      Life Style Modifiers:   Tobacco:  reports that she has been smoking Cigarettes.  She uses smokeless tobacco.   Alcohol:  reports that she does not drink alcohol.   Drug use:  reports that she does not use illicit drugs.  Exercise: 5-6 times a week.                   Diet: good  PAST SURGICAL HISTORY:  No past surgical history on file.  FAMILY HISTORY:  Family History   Problem Relation Age of Onset     Depression Mother      Migraines Mother    SOCIAL  HISTORY:  Came to [place] in 2016 [from [place]] to go to [employer] program [drug and alcohol]. Sober for + two years.Lives in a dorm with two roommates.  Close relationship with parents.  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    Social History     Marital status: Single     Spouse name: N/A     Number of children: N/A     Years of education: N/A     Social History Main Topics     Smoking status: Current Every Day Smoker     Types: Cigarettes     Smokeless tobacco: Current User     Alcohol use No     Drug use: No     Sexual activity: Yes     Partners: Male     Birth control/ protection: Pill   EDICATIONS:  Current Outpatient Prescriptions   Medication Sig Dispense Refill     aluminum chloride (DRYSOL) 20 % external solution Apply topically At Bedtime To improve effect, cover area of application with plastic wrap,  hold in place with tight shirt, and wash area in morning. As sweating improves, decrease use to 1-2 times weekly. 60 mL 1     metroNIDAZOLE (FLAGYL) 500 MG tablet Take 1 tablet (500 mg) by mouth 2 times daily (Patient not taking: Reported on 8/8/2018) 14 tablet 0     norgestrel-ethinyl estradiol (LO/OVRAL) 0.3-30 MG-MCG per tablet Take 1 tablet by mouth daily       sertraline (ZOLOFT) 100 MG tablet Take 1 tablet (100 mg) by mouth daily 90 tablet 2     sertraline (ZOLOFT) 50 MG tablet Start with one tablet for two weeks then increase to 2 tablets = 100 mg (Patient not taking: Reported on 8/8/2018) 60 tablet 1   ALLERGIES:  Review of patient's allergies indicates no known allergies.  VITALS:  /71 (BP Location: Right arm, Patient Position: Sitting, Cuff Size: Adult Large)  Pulse 73  Temp 98.6  F (37  C) (Oral)  Wt 158 lb (71.7 kg)  SpO2 97%  BMI 23.19 kg/m2  There were no vitals taken for this visit.  PHYSICAL EXAM:  Well appearing woman in NAD  Speech: regular rate and rhythm  Mood:excellent    Affect: normal  Thought process: appropriate  Psych motor changes: normal/none  Judgement: good  Insight:  good   Total of 20 minutes was spent in direct contact with the patient and >50% of the time in patient education and coordination of care.  Diagnoses and associated orders for this visit:  Moderate episode of recurrent major depressive disorder (H)        -     Increase sertraline (ZOLOFT) to 150 mg - call if tolerating the higher dose for refill.         -     Takes Vitamin D and considering light therapy [light box]         -      Paper work filled out for an emotional support animal.

## 2018-09-21 ENCOUNTER — OFFICE VISIT (OUTPATIENT)
Dept: FAMILY MEDICINE | Facility: CLINIC | Age: 18
End: 2018-09-21
Payer: COMMERCIAL

## 2018-09-21 VITALS
WEIGHT: 158 LBS | SYSTOLIC BLOOD PRESSURE: 107 MMHG | DIASTOLIC BLOOD PRESSURE: 71 MMHG | HEART RATE: 73 BPM | TEMPERATURE: 98.6 F | BODY MASS INDEX: 23.19 KG/M2 | OXYGEN SATURATION: 97 %

## 2018-09-21 DIAGNOSIS — F33.1 MODERATE EPISODE OF RECURRENT MAJOR DEPRESSIVE DISORDER (H): Primary | ICD-10-CM

## 2018-09-21 DIAGNOSIS — F10.11 H/O ETOH ABUSE: ICD-10-CM

## 2018-09-21 ASSESSMENT — ANXIETY QUESTIONNAIRES
3. WORRYING TOO MUCH ABOUT DIFFERENT THINGS: SEVERAL DAYS
GAD7 TOTAL SCORE: 9
6. BECOMING EASILY ANNOYED OR IRRITABLE: SEVERAL DAYS
1. FEELING NERVOUS, ANXIOUS, OR ON EDGE: MORE THAN HALF THE DAYS
IF YOU CHECKED OFF ANY PROBLEMS ON THIS QUESTIONNAIRE, HOW DIFFICULT HAVE THESE PROBLEMS MADE IT FOR YOU TO DO YOUR WORK, TAKE CARE OF THINGS AT HOME, OR GET ALONG WITH OTHER PEOPLE: SOMEWHAT DIFFICULT
7. FEELING AFRAID AS IF SOMETHING AWFUL MIGHT HAPPEN: SEVERAL DAYS
2. NOT BEING ABLE TO STOP OR CONTROL WORRYING: SEVERAL DAYS
5. BEING SO RESTLESS THAT IT IS HARD TO SIT STILL: SEVERAL DAYS

## 2018-09-21 ASSESSMENT — PAIN SCALES - GENERAL: PAINLEVEL: NO PAIN (0)

## 2018-09-21 ASSESSMENT — PATIENT HEALTH QUESTIONNAIRE - PHQ9: 5. POOR APPETITE OR OVEREATING: MORE THAN HALF THE DAYS

## 2018-09-21 NOTE — NURSING NOTE
18 year old  Chief Complaint   Patient presents with     Depression     discuss options       Blood pressure 107/71, pulse 73, temperature 98.6  F (37  C), temperature source Oral, weight 158 lb (71.7 kg), SpO2 97 %, not currently breastfeeding. Body mass index is 23.19 kg/(m^2).  Patient Active Problem List   Diagnosis     H/O ETOH abuse     Moderate episode of recurrent major depressive disorder (H)       Wt Readings from Last 2 Encounters:   09/21/18 158 lb (71.7 kg)   08/08/18 153 lb 4 oz (69.5 kg)     BP Readings from Last 3 Encounters:   09/21/18 107/71   08/08/18 128/89   06/15/18 124/78         Current Outpatient Prescriptions   Medication     aluminum chloride (DRYSOL) 20 % external solution     norgestrel-ethinyl estradiol (LO/OVRAL) 0.3-30 MG-MCG per tablet     sertraline (ZOLOFT) 100 MG tablet     metroNIDAZOLE (FLAGYL) 500 MG tablet     sertraline (ZOLOFT) 50 MG tablet     No current facility-administered medications for this visit.        Social History   Substance Use Topics     Smoking status: Current Every Day Smoker     Types: Cigarettes     Smokeless tobacco: Current User     Alcohol use No       Health Maintenance Due   Topic Date Due     PEDS DTAP/TDAP (1 - Tdap) 05/19/2005     HPV IMMUNIZATION (1 of 3 - Female 3 Dose Series) 05/19/2009     TETANUS IMMUNIZATION (SYSTEM ASSIGNED)  05/19/2016     DEPRESSION ACTION PLAN Q1 YR  05/19/2016     HIV SCREEN (SYSTEM ASSIGNED)  05/19/2016     INFLUENZA VACCINE (1) 09/01/2018       No results found for: STEPHANIE MendesP.NTone  September 21, 2018 3:22 PM

## 2018-09-21 NOTE — MR AVS SNAPSHOT
After Visit Summary   9/21/2018    Taryn Good    MRN: [id number]           Patient Information     Date Of Birth          2000        Visit Information        Provider Department      9/21/2018 3:00 PM Manisha Charlton MD [facility]         Follow-ups after your visit        Who to contact     Please call your clinic at [phone] to:    Ask questions about your health    Make or cancel appointments    Discuss your medicines    Learn about your test results    Speak to your doctor            Additional Information About Your Visit        MyChart Information     [employer] gives you secure access to your electronic health record. If you see a primary care provider, you can also send messages to your care team and make appointments. If you have questions, please call your primary care clinic.  If you do not have a primary care provider, please call [phone] and they will assist you.      [employer] is an electronic gateway that provides easy, online access to your medical records. With [employer], you can request a clinic appointment, read your test results, renew a prescription or communicate with your care team.     To access your existing account, please contact your [facility] or call [phone] for assistance.        Care EveryWhere ID     This is your Care EveryWhere ID. This could be used by other organizations to access your [place] medical records  [id number]        Your Vitals Were     Pulse Temperature Pulse Oximetry BMI (Body Mass Index)          73 98.6  F (37  C) (Oral) 97% 23.19 kg/m2         Blood Pressure from Last 3 Encounters:   09/21/18 107/71   08/08/18 128/89   06/15/18 124/78    Weight from Last 3 Encounters:   09/21/18 158 lb (71.7 kg)   08/08/18 153 lb 4 oz (69.5 kg)   06/15/18 156 lb (70.8 kg)              Today, you had the following     No orders found for display       Primary Care Provider Office Phone # Fax #    Manisha  MD Latesha [phone]       [address]   [facility] [address]        Equal Access to Services     EMIR GALLARDO : Hadii aad ku hadaicharobinson Jarvis, barbara sakshirobertoha, karma ramsesayan bain, genaro so mary janejillian bolton laConstantinemckay martinez. So [facility] [phone].    ATENCIÓN: Si habla español, tiene a biggs disposición servicios gratuitos de asistencia lingüística. Llame al [phone].    We comply with applicable federal civil rights laws and [place] laws. We do not discriminate on the basis of race, color, national origin, age, disability, sex, sexual orientation, or gender identity.            Thank you!     Thank you for choosing [facility]  for your care. Our goal is always to provide you with excellent care. Hearing back from our patients is one way we can continue to improve our services. Please take a few minutes to complete the written survey that you may receive in the mail after your visit with us. Thank you!             Your Updated Medication List - Protect others around you: Learn how to safely use, store and throw away your medicines at www.disposemymeds.org.          This list is accurate as of 9/21/18  4:10 PM.  Always use your most recent med list.                   Brand Name Dispense Instructions for use Diagnosis    aluminum chloride 20 % external solution    DRYSOL    60 mL    Apply topically At Bedtime To improve effect, cover area of application with plastic wrap,  hold in place with tight shirt, and wash area in morning. As sweating improves, decrease use to 1-2 times weekly.    Generalized hyperhidrosis       metroNIDAZOLE 500 MG tablet    FLAGYL    14 tablet    Take 1 tablet (500 mg) by mouth 2 times daily    BV (bacterial vaginosis)       norgestrel-ethinyl estradiol 0.3-30 MG-MCG per tablet    LO/OVRAL     Take 1 tablet by mouth daily        * sertraline 50 MG tablet    ZOLOFT    60 tablet    Start with one tablet for two weeks then increase to 2 tablets = 100 mg     Moderate episode of recurrent major depressive disorder (H)       * sertraline 100 MG tablet    ZOLOFT    90 tablet    Take 1 tablet (100 mg) by mouth daily    Moderate episode of recurrent major depressive disorder (H)       * Notice:  This list has 2 medication(s) that are the same as other medications prescribed for you. Read the directions carefully, and ask your doctor or other care provider to review them with you.

## 2018-09-21 NOTE — NURSING NOTE
18 year old  Chief Complaint   Patient presents with     Depression     option       Blood pressure 107/71, pulse 73, temperature 98.6  F (37  C), temperature source Oral, weight 158 lb (71.7 kg), SpO2 97 %, not currently breastfeeding. Body mass index is 23.19 kg/(m^2).  Patient Active Problem List   Diagnosis     H/O ETOH abuse     Moderate episode of recurrent major depressive disorder (H)       Wt Readings from Last 2 Encounters:   09/21/18 158 lb (71.7 kg)   08/08/18 153 lb 4 oz (69.5 kg)     BP Readings from Last 3 Encounters:   09/21/18 107/71   08/08/18 128/89   06/15/18 124/78         Current Outpatient Prescriptions   Medication     aluminum chloride (DRYSOL) 20 % external solution     norgestrel-ethinyl estradiol (LO/OVRAL) 0.3-30 MG-MCG per tablet     sertraline (ZOLOFT) 100 MG tablet     metroNIDAZOLE (FLAGYL) 500 MG tablet     sertraline (ZOLOFT) 50 MG tablet     No current facility-administered medications for this visit.        Social History   Substance Use Topics     Smoking status: Current Every Day Smoker     Types: Cigarettes     Smokeless tobacco: Current User     Alcohol use No       Health Maintenance Due   Topic Date Due     PEDS DTAP/TDAP (1 - Tdap) 05/19/2005     HPV IMMUNIZATION (1 of 3 - Female 3 Dose Series) 05/19/2009     TETANUS IMMUNIZATION (SYSTEM ASSIGNED)  05/19/2016     DEPRESSION ACTION PLAN Q1 YR  05/19/2016     HIV SCREEN (SYSTEM ASSIGNED)  05/19/2016     INFLUENZA VACCINE (1) 09/01/2018       No results found for: STEPHANIE MendesPToneNTone  September 21, 2018 3:21 PM

## 2018-09-22 ASSESSMENT — PATIENT HEALTH QUESTIONNAIRE - PHQ9: SUM OF ALL RESPONSES TO PHQ QUESTIONS 1-9: 13

## 2018-09-22 ASSESSMENT — ANXIETY QUESTIONNAIRES: GAD7 TOTAL SCORE: 9

## 2018-10-18 ENCOUNTER — TELEPHONE (OUTPATIENT)
Dept: FAMILY MEDICINE | Facility: CLINIC | Age: 18
End: 2018-10-18

## 2018-10-18 NOTE — TELEPHONE ENCOUNTER
----- Message from Manisha Charlton MD sent at 10/17/2018  6:43 PM CDT -----  Regarding: RE: Forms faxed  Contact: [phone]  Faxed.  CJT  ----- Message -----     From: Leyla Leal RN     Sent: 10/12/2018   3:30 PM       To: Manisha Charlton MD  Subject: FW: Forms faxed                                   Talked to her - it is another form - she will drop by [facility] as she lives at [facility], and leave form with the clinic nurses there. thanks  ----- Message -----     From: Manisha Charlton MD     Sent: 10/12/2018   9:37 AM       To: Leyla Leal RN  Subject: RE: Forms faxed                                  I filled out a form when I saw her in clinic - did she fax another one.     Can someone call her and ask her if another form is needed.   Manisha Charlton MD  ----- Message -----     From: Leyla Leal RN     Sent: 10/11/2018   4:55 PM       To: Manisha Charlton MD  Subject: FW: Forms faxed                                  [facility] be at [facility] We do not have it -  ----- Message -----     From: Tawny Diaz     Sent: 10/11/2018   3:43 PM       To: [facility] Nurse Pool  Subject: Forms faxed                                      Patient faxed over forms for Dr. Charlton to fill out and sign, they are for an emotional support animal. She is wondering if they were received; I explained Dr. Charlton is in only on Fridays. If the forms were NOT received via fax, please contact patient, okay to leave a message on voicemail.       Thank you!  Lauren    Call Center       Please DO NOT send this message and/or reply back to sender. Call Center Representatives DO NOT respond to messages.

## 2019-01-17 ENCOUNTER — OFFICE VISIT (OUTPATIENT)
Dept: FAMILY MEDICINE | Facility: CLINIC | Age: 19
End: 2019-01-17
Payer: COMMERCIAL

## 2019-01-17 VITALS
DIASTOLIC BLOOD PRESSURE: 72 MMHG | WEIGHT: 162.75 LBS | RESPIRATION RATE: 18 BRPM | BODY MASS INDEX: 23.89 KG/M2 | TEMPERATURE: 97.9 F | OXYGEN SATURATION: 100 % | HEART RATE: 69 BPM | SYSTOLIC BLOOD PRESSURE: 117 MMHG

## 2019-01-17 DIAGNOSIS — R10.9 ABDOMINAL DISCOMFORT: Primary | ICD-10-CM

## 2019-01-17 DIAGNOSIS — Z30.09 COUNSELING FOR BIRTH CONTROL, ORAL CONTRACEPTIVES: ICD-10-CM

## 2019-01-17 NOTE — NURSING NOTE
18 year old  Chief Complaint   Patient presents with     Constipation     Bloated       Blood pressure 117/72, pulse 69, temperature 97.9  F (36.6  C), temperature source Oral, resp. rate 18, weight 73.8 kg (162 lb 12 oz), last menstrual period 2018, SpO2 100 %, not currently breastfeeding. Body mass index is 23.89 kg/m .  Patient Active Problem List   Diagnosis     H/O ETOH abuse     Moderate episode of recurrent major depressive disorder (H)       Wt Readings from Last 2 Encounters:   19 73.8 kg (162 lb 12 oz)   18 71.7 kg (158 lb)     BP Readings from Last 3 Encounters:   19 117/72   18 107/71   18 128/89         Current Outpatient Medications   Medication     aluminum chloride (DRYSOL) 20 % external solution     metroNIDAZOLE (FLAGYL) 500 MG tablet     norgestrel-ethinyl estradiol (LO/OVRAL) 0.3-30 MG-MCG per tablet     sertraline (ZOLOFT) 100 MG tablet     sertraline (ZOLOFT) 50 MG tablet     No current facility-administered medications for this visit.        Social History     Tobacco Use     Smoking status: Former Smoker     Types: Cigarettes     Last attempt to quit: 2018     Years since quittin.6     Smokeless tobacco: Current User   Substance Use Topics     Alcohol use: No     Drug use: No       Health Maintenance Due   Topic Date Due     DTAP/TDAP/TD IMMUNIZATION (1 - Tdap) 2005     HPV IMMUNIZATION (1 - Female 3-dose series) 2009     MENINGITIS IMMUNIZATION (1 - 2-dose series) 2014     DEPRESSION ACTION PLAN  2016     HIV SCREEN (SYSTEM ASSIGNED)  2016       No results found for: PAP      2019 2:32 PM

## 2019-01-17 NOTE — PROGRESS NOTES
SUBJECTIVE:   Taryn Good is a 18 year old female who presents to clinic today for a return visit.    # Stomach Issues  - Symptoms: bloating, gassiness, constipation, pain. Severe enough to interfere with activities  - pain/discomfort tends to last all day  - sometimes sensation of needing to go to the bathroom but unable to go  - sometimes rectal pain when havng a BM but this isn't particularly an issue  - no loose stools outside of when she takes laxatives  - BM every 3 days  - Frequency of symptoms: used to just be around menses, now more frequently - 3x/week  - Duration of symptoms: past year and a half  - Relief of pain with stooling?: sometimes yes but not always  - sometimes will take a laxative in order to have a BM  - Change in stool form?: stools are more firm when having pain  - tried eliminating dairy, caffeine without improvement  - Red flag symptoms? (rectal bleeding, nocturnal or progressive abdominal pain, nocturnal diarrhea, weight loss): no    - menses regular, sexually active with regular male partner, uses condoms  - no vaginal discharge, no pain with sex  - now no association with menstrual cycle  - stopped zoloft 3-4 months ago  - not taking any medications or supplements other than biotin    # Contraception  - takes COCP  - wants to talk about LARC  - tired of taking daily pill      Hemoglobin   Date Value Ref Range Status   08/08/2018 13.4 11.7 - 15.7 g/dL Final     Wt Readings from Last 4 Encounters:   01/17/19 73.8 kg (162 lb 12 oz)   09/21/18 71.7 kg (158 lb)   08/08/18 69.5 kg (153 lb 4 oz)   06/15/18 70.8 kg (156 lb)       ROS: Denies fevers, chills, chest pain, difficulty breathing, abdominal pain    Patient Active Problem List   Diagnosis     H/O ETOH abuse     Moderate episode of recurrent major depressive disorder (H)     Current Outpatient Medications   Medication     norgestrel-ethinyl estradiol (LO/OVRAL) 0.3-30 MG-MCG tablet     No current facility-administered medications  for this visit.        I have reviewed the patient's relevant past medical history.     OBJECTIVE:   /72 (BP Location: Right arm, Patient Position: Sitting, Cuff Size: Adult Regular)   Pulse 69   Temp 97.9  F (36.6  C) (Oral)   Resp 18   Wt 73.8 kg (162 lb 12 oz)   LMP 12/20/2018   SpO2 100%   BMI 23.89 kg/m      Constitutional: well-appearing, appears stated age  Eyes: conjunctivae without erythema, sclera anicteric.   Abdomen: soft, nontender, normal bowel sounds.   Pelvic: normal external genitalia. Unremarkable adnexal exam, no tenderness. No fundal tenderness or uterine enlargement. No cervical motion tenderness.  Skin: no rashes, lesions, or wounds  Psych: affect is full and appropriate, speech is fluent and non-pressured      ASSESSMENT AND PLAN:     (R10.9) Abdominal discomfort  (primary encounter diagnosis)  Comment: Unclear etiology. Not quite fitting typical IBS-C pattern of symptoms but also no red flag symptoms and a benign exam today. Trial of low FODMAP diet and psyllium fiber supplementation. Return 3 weeks to check in on symptoms. Recent CBC with normal hemoglobin. No other lab testing planned at this stage.   Plan:     (Z30.09) Counseling for birth control, oral contraceptives  Comment: Refill COCP. Reviewed LARC options. Send to OB/GYN to discuss further.  Plan: norgestrel-ethinyl estradiol (LO/OVRAL) 0.3-30         MG-MCG tablet, OB/GYN REFERRAL          Nolan Grace   [facility]  01/17/2019, 3:11 PM

## 2019-01-22 ENCOUNTER — OFFICE VISIT (OUTPATIENT)
Dept: OBGYN | Facility: CLINIC | Age: 19
End: 2019-01-22
Attending: OBSTETRICS & GYNECOLOGY
Payer: COMMERCIAL

## 2019-01-22 VITALS
WEIGHT: 162.2 LBS | BODY MASS INDEX: 23.81 KG/M2 | HEART RATE: 83 BPM | SYSTOLIC BLOOD PRESSURE: 127 MMHG | DIASTOLIC BLOOD PRESSURE: 77 MMHG

## 2019-01-22 DIAGNOSIS — Z30.09 COUNSELING FOR BIRTH CONTROL, ORAL CONTRACEPTIVES: ICD-10-CM

## 2019-01-22 PROCEDURE — G0463 HOSPITAL OUTPT CLINIC VISIT: HCPCS | Mod: ZF

## 2019-01-22 ASSESSMENT — PAIN SCALES - GENERAL: PAINLEVEL: NO PAIN (0)

## 2019-01-22 NOTE — PROGRESS NOTES
LMP 12/20/2018. Has been in a monogamous relationship for 1 month with occasional condom use. Has been taking oral birth control since high school initially for painful menstrual cramps but now for birth control, is interested in a long acting birth control option that she does not need to think about daily and would like to avoid having a period. Her periods are regular every 30 days lasting 3 days, heavy only on day one. She is undecided on what method of birth control she would like and has discussed LARCs and Nexplanon with her friends.    O: /77 (BP Location: Left arm, Patient Position: Sitting, Cuff Size: Adult Regular)   Pulse 83   Wt 73.6 kg (162 lb 3.2 oz)   LMP 12/20/2018   Breastfeeding? No   BMI 23.81 kg/m      Review Of Systems  Skin: acne  Cardiovascular: negative for, palpitations, tachycardia and irregular heart beat  Genitourinary: negative for, frequency and urgency  Neurologic: positive for headaches  Psychiatric: mood changes    A/P:   Healthy 19yo seeking long acting birth control for pregnancy prevention.    Reviewed options for LARCs using models and demonstration of speculum exam. Discussed risks benefits and associated adjustment periods for IUD and Nexplanon. Given that Taryn is a nullip and is not wanting birth control for HMB she would like to try the Nexplanon.    Will return in 1-2 weeks for nexplanon, will continue to use oral birth control for the interm.     Renee Brewer CNM    Greater than 50% of this visit was spent in counseling on reproductive options.

## 2019-01-22 NOTE — NURSING NOTE
Chief Complaint   Patient presents with     Contraception     has been taking oral contraception and is now looking to see what other options she can take     Health Maintenance Due   Topic Date Due     DTAP/TDAP/TD IMMUNIZATION (1 - Tdap) 05/19/2005     HPV IMMUNIZATION (1 - Female 3-dose series) 05/19/2009     MENINGITIS IMMUNIZATION (1 - 2-dose series) 05/19/2014     DEPRESSION ACTION PLAN  05/19/2016     HIV SCREEN (SYSTEM ASSIGNED)  05/19/2016     Sneha Vera CMA on 1/22/2019 at 2:50 PM

## 2019-01-22 NOTE — LETTER
RE: Taryn Good  [address]  [facility] [address]     Dear Colleague,    Thank you for referring your patient, Taryn Good, to the [facility] at [facility]. Please see a copy of my visit note below.    LMP 12/20/2018. Has been in a monogamous relationship for 1 month with occasional condom use. Has been taking oral birth control since high school initially for painful menstrual cramps but now for birth control, is interested in a long acting birth control option that she does not need to think about daily and would like to avoid having a period. Her periods are regular every 30 days lasting 3 days, heavy only on day one. She is undecided on what method of birth control she would like and has discussed LARCs and Nexplanon with her friends.    O: /77 (BP Location: Left arm, Patient Position: Sitting, Cuff Size: Adult Regular)   Pulse 83   Wt 73.6 kg (162 lb 3.2 oz)   LMP 12/20/2018   Breastfeeding? No   BMI 23.81 kg/m       Review Of Systems  Skin: acne  Cardiovascular: negative for, palpitations, tachycardia and irregular heart beat  Genitourinary: negative for, frequency and urgency  Neurologic: positive for headaches  Psychiatric: mood changes    A/P:   Healthy 17yo seeking long acting birth control for pregnancy prevention.    Reviewed options for LARCs using models and demonstration of speculum exam. Discussed risks benefits and associated adjustment periods for IUD and Nexplanon. Given that Taryn is a nullip and is not wanting birth control for HMB she would like to try the Nexplanon.    Will return in 1-2 weeks for nexplanon, will continue to use oral birth control for the interm.     Renee Brewer CNM    Greater than 50% of this visit was spent in counseling on reproductive options.

## 2019-01-25 ENCOUNTER — OFFICE VISIT (OUTPATIENT)
Dept: OBGYN | Facility: CLINIC | Age: 19
End: 2019-01-25
Attending: MIDWIFE
Payer: COMMERCIAL

## 2019-01-25 VITALS — HEIGHT: 69 IN | WEIGHT: 157.6 LBS | BODY MASS INDEX: 23.34 KG/M2

## 2019-01-25 DIAGNOSIS — Z32.02 URINE PREGNANCY TEST NEGATIVE: ICD-10-CM

## 2019-01-25 DIAGNOSIS — Z30.017 INSERTION OF IMPLANTABLE SUBDERMAL CONTRACEPTIVE: ICD-10-CM

## 2019-01-25 DIAGNOSIS — Z30.017 INSERTION OF NEXPLANON: Primary | ICD-10-CM

## 2019-01-25 DIAGNOSIS — Z11.3 ROUTINE SCREENING FOR STI (SEXUALLY TRANSMITTED INFECTION): ICD-10-CM

## 2019-01-25 LAB
HCG UR QL: NEGATIVE
HIV 1+2 AB+HIV1 P24 AG SERPL QL IA: NONREACTIVE
INTERNAL QC OK POCT: YES

## 2019-01-25 PROCEDURE — 36415 COLL VENOUS BLD VENIPUNCTURE: CPT | Performed by: ADVANCED PRACTICE MIDWIFE

## 2019-01-25 PROCEDURE — 86780 TREPONEMA PALLIDUM: CPT | Performed by: ADVANCED PRACTICE MIDWIFE

## 2019-01-25 PROCEDURE — 25000128 H RX IP 250 OP 636: Mod: ZF

## 2019-01-25 PROCEDURE — 81025 URINE PREGNANCY TEST: CPT | Mod: ZF | Performed by: ADVANCED PRACTICE MIDWIFE

## 2019-01-25 PROCEDURE — 87491 CHLMYD TRACH DNA AMP PROBE: CPT | Performed by: ADVANCED PRACTICE MIDWIFE

## 2019-01-25 PROCEDURE — G0463 HOSPITAL OUTPT CLINIC VISIT: HCPCS | Mod: 25,ZF

## 2019-01-25 PROCEDURE — 87389 HIV-1 AG W/HIV-1&-2 AB AG IA: CPT | Performed by: ADVANCED PRACTICE MIDWIFE

## 2019-01-25 PROCEDURE — 11981 INSERTION DRUG DLVR IMPLANT: CPT | Mod: ZF | Performed by: ADVANCED PRACTICE MIDWIFE

## 2019-01-25 PROCEDURE — 87591 N.GONORRHOEAE DNA AMP PROB: CPT | Performed by: ADVANCED PRACTICE MIDWIFE

## 2019-01-25 ASSESSMENT — PAIN SCALES - GENERAL: PAINLEVEL: NO PAIN (0)

## 2019-01-25 ASSESSMENT — MIFFLIN-ST. JEOR: SCORE: 1552.63

## 2019-01-25 NOTE — LETTER
"  RE: Taryn Good  [id number]  [facility] [address]     Dear Colleague,    Thank you for referring your patient, Taryn Good, to the [facility] at [facility]. Please see a copy of my visit note below.    Nexplanon Insertion:    Is a pregnancy test required: No.  Was a consent obtained?  Yes    Subjective: Taryn Good is a 18 year old  presents for Nexplanon.    Patient has been given the opportunity to ask questions about all forms of birth control, including all options appropriate for Taryn Good. Discussed that no method of birth control, except abstinence is 100% effective against pregnancy or sexually transmitted infection.     Taryn Good understands she may have the Nexplanon removed at any time and it should be removed by a health care provider.    The entire insertion procedure was reviewed with the patient, including care after placement.    Patient's last menstrual period was 2018. Last sexual activity: 19. No allergy to betadine or shellfish. Patient desires STD screening  HCG Qual Urine   Date Value Ref Range Status   2018 NEGATIVE Negative Final     Ht 1.758 m (5' 9.21\")   Wt 71.5 kg (157 lb 9.6 oz)   LMP 2018   BMI 23.13 kg/m       PROCEDURE NOTE: -- Nexplanon Insertion    Reason for Insertion: contraception    Patient was placed supine with left arm exposed.  Rosi was made 8-10 cm above medial epicondyle and a guiding rosi 4 cm above the first.  Arm was prepped with Betadine. Insertion point was anesthetized with 2 mL 1% lidocaine. After stretching the skin with thumb and index finger around the insertion site, skin punctured with the tip of the needle inserted at 30 degrees and then lowered to horizontal position. The needle was then advanced to its full length. Applicator was then stabilized and slider was unlocked. Slider was pulled back until it stopped and then " removed.    Correct placement of the implant was confirmed by palpation in the patient's arm and visualizing the purple top of the obturator.   Bandage and pressure dressing applied to insertion site.    Lot # [id number]  Exp: 03/2021    EBL: minimal    Complications: none    ASSESSMENT:     ICD-10-CM    1. Insertion of Nexplanon Z30.017 etonogestrel (IMPLANON/NEXPLANON) subdermal implant 68 mg   2. Routine screening for STI (sexually transmitted infection) Z11.3 Chlamydia PCR (Clinic Collect)     Gonorrhea PCR     HIV Antigen Antibody Combo     Treponema Abs w Reflex to RPR and Titer      PLAN:    Given 's handouts, including when to have Nexplanon removed, list of danger s/sx, side effects and follow up recommended. Encouraged condom use for prevention of STD. Back up contraception advised for 7 days. Advised to call for any fever, for prolonged or severe pain or bleeding, abnormal vaginal dischage. She was advised to use pain medications (ibuprofen) as needed for mild to moderate pain.     KAYLEIGH Martini CNM

## 2019-01-25 NOTE — PROGRESS NOTES
"Nexplanon Insertion:    Is a pregnancy test required: No.  Was a consent obtained?  Yes    Subjective: Taryn Good is a 18 year old  presents for Nexplanon.    Patient has been given the opportunity to ask questions about all forms of birth control, including all options appropriate for Taryn Good. Discussed that no method of birth control, except abstinence is 100% effective against pregnancy or sexually transmitted infection.     Taryn Good understands she may have the Nexplanon removed at any time and it should be removed by a health care provider.    The entire insertion procedure was reviewed with the patient, including care after placement.    Patient's last menstrual period was 2018. Last sexual activity: 19. No allergy to betadine or shellfish. Patient desires STD screening  HCG Qual Urine   Date Value Ref Range Status   2018 NEGATIVE Negative Final     Ht 1.758 m (5' 9.21\")   Wt 71.5 kg (157 lb 9.6 oz)   LMP 2018   BMI 23.13 kg/m      PROCEDURE NOTE: -- Nexplanon Insertion    Reason for Insertion: contraception    Patient was placed supine with left arm exposed.  Rosi was made 8-10 cm above medial epicondyle and a guiding rosi 4 cm above the first.  Arm was prepped with Betadine. Insertion point was anesthetized with 2 mL 1% lidocaine. After stretching the skin with thumb and index finger around the insertion site, skin punctured with the tip of the needle inserted at 30 degrees and then lowered to horizontal position. The needle was then advanced to its full length. Applicator was then stabilized and slider was unlocked. Slider was pulled back until it stopped and then removed.    Correct placement of the implant was confirmed by palpation in the patient's arm and visualizing the purple top of the obturator.   Bandage and pressure dressing applied to insertion site.    Lot # [id number]  Exp: 2021    EBL: minimal    Complications: none    ASSESSMENT: "     ICD-10-CM    1. Insertion of Nexplanon Z30.017 etonogestrel (IMPLANON/NEXPLANON) subdermal implant 68 mg   2. Routine screening for STI (sexually transmitted infection) Z11.3 Chlamydia PCR (Clinic Collect)     Gonorrhea PCR     HIV Antigen Antibody Combo     Treponema Abs w Reflex to RPR and Titer      PLAN:    Given 's handouts, including when to have Nexplanon removed, list of danger s/sx, side effects and follow up recommended. Encouraged condom use for prevention of STD. Back up contraception advised for 7 days. Advised to call for any fever, for prolonged or severe pain or bleeding, abnormal vaginal dischage. She was advised to use pain medications (ibuprofen) as needed for mild to moderate pain.     KAYLEIGH MartiniM

## 2019-01-26 LAB — T PALLIDUM AB SER QL: NONREACTIVE

## 2019-01-27 LAB
N GONORRHOEA DNA SPEC QL NAA+PROBE: NEGATIVE
SPECIMEN SOURCE: NORMAL

## 2019-01-28 ENCOUNTER — TELEPHONE (OUTPATIENT)
Dept: OBGYN | Facility: CLINIC | Age: 19
End: 2019-01-28

## 2019-01-28 DIAGNOSIS — Z11.8 SPECIAL SCREENING EXAMINATION FOR CHLAMYDIAL DISEASE: Primary | ICD-10-CM

## 2019-01-28 PROBLEM — Z97.5 NEXPLANON IN PLACE: Chronic | Status: ACTIVE | Noted: 2019-01-28

## 2019-01-28 LAB
C TRACH DNA SPEC QL NAA+PROBE: POSITIVE
SPECIMEN SOURCE: ABNORMAL

## 2019-01-28 RX ORDER — AZITHROMYCIN 500 MG/1
TABLET, FILM COATED ORAL
Qty: 2 TABLET | Refills: 1 | Status: SHIPPED | OUTPATIENT
Start: 2019-01-28 | End: 2019-06-25

## 2019-01-28 NOTE — TELEPHONE ENCOUNTER
Patient returned call to discuss results.    Informed patient of positive chlamydia result. Advised patient of treatment and answered pt questions.     Pt desires partner treatment. Has had one partner in last 60 days and will notify him.    Sending to pt preferred pharmacy    Completed [facility] Form

## 2019-01-28 NOTE — TELEPHONE ENCOUNTER
Received call from Iva at [facility] that pt tested positive for chlamydia.    Tried to reach Taryn but received there was no answer and voicemail box was full.    Will try her again.    [facility] form started.

## 2019-04-30 ENCOUNTER — OFFICE VISIT (OUTPATIENT)
Dept: MIDWIFE SERVICES | Facility: CLINIC | Age: 19
End: 2019-04-30
Payer: COMMERCIAL

## 2019-04-30 VITALS
OXYGEN SATURATION: 98 % | WEIGHT: 157 LBS | SYSTOLIC BLOOD PRESSURE: 121 MMHG | BODY MASS INDEX: 23.04 KG/M2 | DIASTOLIC BLOOD PRESSURE: 79 MMHG | HEART RATE: 77 BPM

## 2019-04-30 DIAGNOSIS — Z11.3 SCREEN FOR STD (SEXUALLY TRANSMITTED DISEASE): ICD-10-CM

## 2019-04-30 DIAGNOSIS — Z30.011 ENCOUNTER FOR INITIAL PRESCRIPTION OF CONTRACEPTIVE PILLS: ICD-10-CM

## 2019-04-30 DIAGNOSIS — Z30.46 NEXPLANON REMOVAL: Primary | ICD-10-CM

## 2019-04-30 DIAGNOSIS — Z30.09 COUNSELING FOR BIRTH CONTROL, ORAL CONTRACEPTIVES: ICD-10-CM

## 2019-04-30 PROCEDURE — 87491 CHLMYD TRACH DNA AMP PROBE: CPT | Performed by: ADVANCED PRACTICE MIDWIFE

## 2019-04-30 PROCEDURE — 11982 REMOVE DRUG IMPLANT DEVICE: CPT | Performed by: ADVANCED PRACTICE MIDWIFE

## 2019-04-30 PROCEDURE — 87591 N.GONORRHOEAE DNA AMP PROB: CPT | Performed by: ADVANCED PRACTICE MIDWIFE

## 2019-04-30 PROCEDURE — 99213 OFFICE O/P EST LOW 20 MIN: CPT | Mod: 25 | Performed by: ADVANCED PRACTICE MIDWIFE

## 2019-05-01 LAB
C TRACH DNA SPEC QL NAA+PROBE: POSITIVE
N GONORRHOEA DNA SPEC QL NAA+PROBE: NEGATIVE
SPECIMEN SOURCE: ABNORMAL
SPECIMEN SOURCE: NORMAL

## 2019-05-02 ENCOUNTER — TELEPHONE (OUTPATIENT)
Dept: MIDWIFE SERVICES | Facility: CLINIC | Age: 19
End: 2019-05-02

## 2019-05-02 DIAGNOSIS — A74.9 CHLAMYDIA INFECTION: Primary | ICD-10-CM

## 2019-05-02 DIAGNOSIS — A74.9 CHLAMYDIA INFECTION: ICD-10-CM

## 2019-05-02 RX ORDER — AZITHROMYCIN 500 MG/1
1000 TABLET, FILM COATED ORAL DAILY
Qty: 2 TABLET | Refills: 0 | Status: SHIPPED | OUTPATIENT
Start: 2019-05-02 | End: 2019-05-02

## 2019-05-02 NOTE — PROGRESS NOTES
S: Patient presents for Nexplanon removal. It was placed on 1/22/19 and she has had consistent bleeding and daily headaches since. She is going to [place] for two weeks of studying abroad with school and doesn't want to be bleeding every day. She has used an OCP in the past and is interested in returning to this method. Also, she is requesting a test of cure for chlamydia that was positive in January. She and her partner treated but she would like to confirm that she is no longer positive.     O: /79   Pulse 77   Wt 71.2 kg (157 lb)   SpO2 98%   BMI 23.04 kg/m      With patient lying supine on the exam table she had her arm raised perpendicular to her body. The insertion site was identified by the scar from placement. The area of cleansed with betadine x3. 2ml of Lidocaine 1% used to anesthetize the area of intended incision. A scalpel was used to make a small incision and the Nexplanon joey was visualized and grasped with a curved mechelle clamp. The joey was removed intact and the incision was approximated with steri strips. A pressure dressing was applied. The patient tolerated the procedure well.     A: Nexplanon removal  STD testing    P: OCP rx sent to preferred pharmacy. Reviewed use and what to do if pills are missed.   Will notify patient of results via [employer] or phone call if f/u necessary.   RTC yearly for annual exam or sooner prn. Consider pap at next visit as she turns 19 in May.   Ny Arteaga CNM

## 2019-05-02 NOTE — TELEPHONE ENCOUNTER
Patient calling asking if a prescription can be sent in for her partner as well. I have a prescription teed up and put partner therapy - please sign if okay. Thanks!  Daxa Mac

## 2019-05-02 NOTE — TELEPHONE ENCOUNTER
Please call patient to notify that the chlamydia test came back positive. She had a positive screen in January and was treated. She should make sure that her partner is treated before having intercourse again. Also please fill out [facility] form. I sent Azithromycin 1g PO x1 to [facility]. She should  and take ASAP. She can come back for another test of cure just before she leaves for [place] if she would like. Thanks, Ny Arteaga CNM

## 2019-05-03 RX ORDER — AZITHROMYCIN 500 MG/1
1000 TABLET, FILM COATED ORAL DAILY
Qty: 2 TABLET | Refills: 0 | Status: SHIPPED | OUTPATIENT
Start: 2019-05-03 | End: 2019-06-25

## 2019-05-13 ENCOUNTER — OFFICE VISIT (OUTPATIENT)
Dept: MIDWIFE SERVICES | Facility: CLINIC | Age: 19
End: 2019-05-13
Payer: COMMERCIAL

## 2019-05-13 VITALS
HEART RATE: 68 BPM | SYSTOLIC BLOOD PRESSURE: 118 MMHG | WEIGHT: 156 LBS | DIASTOLIC BLOOD PRESSURE: 80 MMHG | BODY MASS INDEX: 22.9 KG/M2

## 2019-05-13 DIAGNOSIS — Z11.3 SCREEN FOR STD (SEXUALLY TRANSMITTED DISEASE): Primary | ICD-10-CM

## 2019-05-13 LAB
SPECIMEN SOURCE: ABNORMAL
WET PREP SPEC: ABNORMAL

## 2019-05-13 PROCEDURE — 87591 N.GONORRHOEAE DNA AMP PROB: CPT | Performed by: ADVANCED PRACTICE MIDWIFE

## 2019-05-13 PROCEDURE — 87491 CHLMYD TRACH DNA AMP PROBE: CPT | Performed by: ADVANCED PRACTICE MIDWIFE

## 2019-05-13 PROCEDURE — 87210 SMEAR WET MOUNT SALINE/INK: CPT | Performed by: ADVANCED PRACTICE MIDWIFE

## 2019-05-13 PROCEDURE — 99212 OFFICE O/P EST SF 10 MIN: CPT | Performed by: ADVANCED PRACTICE MIDWIFE

## 2019-05-13 NOTE — NURSING NOTE
"Chief Complaint   Patient presents with     Consult       Initial /80   Pulse 68   Wt 70.8 kg (156 lb)   BMI 22.90 kg/m   Estimated body mass index is 22.9 kg/m  as calculated from the following:    Height as of 19: 1.758 m (5' 9.21\").    Weight as of this encounter: 70.8 kg (156 lb).  BP completed using cuff size: regular    Questioned patient about current smoking habits.  Pt. has never smoked.          The following HM Due: NONE      The following patient reported/Care Every where data was sent to:  P ABSTRACT QUALITY INITIATIVES [[id number]]        N/a      Alma Bai MA               "

## 2019-05-13 NOTE — PROGRESS NOTES
S:  Taryn Good is a 18 year old  who presents today for STD MARIELA. She had positive chlamydia in January and both her and her partner were treated. She returned for a nexplanon removal and asked to have testing done again which returned positive for chlamydia. Both her and her partner were treated. They are not together anymore. She would like testing again today. She declines blood testing for STDs. She has no other questions or concerns today.     O:  /80   Pulse 68   Wt 70.8 kg (156 lb)   BMI 22.90 kg/m    Self collected swabs.     A:  STD testing     P:  (Z11.3) Screen for STD (sexually transmitted disease)  (primary encounter diagnosis)  Plan: Neisseria gonorrhoeae PCR, Chlamydia         trachomatis PCR, Wet prep    Patient desires phone call for results. She is going on a trip and would like to know results before leaving.   Shelly Henderson CNM

## 2019-06-25 ENCOUNTER — OFFICE VISIT (OUTPATIENT)
Dept: FAMILY MEDICINE | Facility: CLINIC | Age: 19
End: 2019-06-25
Payer: COMMERCIAL

## 2019-06-25 VITALS
SYSTOLIC BLOOD PRESSURE: 119 MMHG | HEART RATE: 76 BPM | TEMPERATURE: 97.9 F | HEIGHT: 70 IN | RESPIRATION RATE: 16 BRPM | OXYGEN SATURATION: 100 % | DIASTOLIC BLOOD PRESSURE: 82 MMHG | WEIGHT: 154.5 LBS | BODY MASS INDEX: 22.12 KG/M2

## 2019-06-25 DIAGNOSIS — F33.1 MODERATE EPISODE OF RECURRENT MAJOR DEPRESSIVE DISORDER (H): Primary | ICD-10-CM

## 2019-06-25 DIAGNOSIS — R45.86 EMOTIONAL LABILITY: ICD-10-CM

## 2019-06-25 RX ORDER — HYDROXYZINE HYDROCHLORIDE 25 MG/1
25 TABLET, FILM COATED ORAL 3 TIMES DAILY PRN
Qty: 20 TABLET | Refills: 0 | Status: SHIPPED | OUTPATIENT
Start: 2019-06-25 | End: 2020-02-24

## 2019-06-25 RX ORDER — CITALOPRAM HYDROBROMIDE 20 MG/1
20 TABLET ORAL DAILY
Qty: 20 TABLET | Refills: 1 | Status: SHIPPED | OUTPATIENT
Start: 2019-06-25 | End: 2019-07-30

## 2019-06-25 ASSESSMENT — PATIENT HEALTH QUESTIONNAIRE - PHQ9
SUM OF ALL RESPONSES TO PHQ QUESTIONS 1-9: 20
5. POOR APPETITE OR OVEREATING: MORE THAN HALF THE DAYS

## 2019-06-25 ASSESSMENT — ANXIETY QUESTIONNAIRES
7. FEELING AFRAID AS IF SOMETHING AWFUL MIGHT HAPPEN: MORE THAN HALF THE DAYS
5. BEING SO RESTLESS THAT IT IS HARD TO SIT STILL: MORE THAN HALF THE DAYS
3. WORRYING TOO MUCH ABOUT DIFFERENT THINGS: NEARLY EVERY DAY
IF YOU CHECKED OFF ANY PROBLEMS ON THIS QUESTIONNAIRE, HOW DIFFICULT HAVE THESE PROBLEMS MADE IT FOR YOU TO DO YOUR WORK, TAKE CARE OF THINGS AT HOME, OR GET ALONG WITH OTHER PEOPLE: VERY DIFFICULT
1. FEELING NERVOUS, ANXIOUS, OR ON EDGE: MORE THAN HALF THE DAYS
6. BECOMING EASILY ANNOYED OR IRRITABLE: NEARLY EVERY DAY
GAD7 TOTAL SCORE: 17
2. NOT BEING ABLE TO STOP OR CONTROL WORRYING: NEARLY EVERY DAY

## 2019-06-25 ASSESSMENT — MIFFLIN-ST. JEOR: SCORE: 1539.81

## 2019-06-25 NOTE — PATIENT INSTRUCTIONS
Please send me a [employer] message in 2 weeks.  Sooner if any problems or concerns.    Use the hydroxyzine when you are feeling out of control.  It may make you a bit drowsy.    Follow up appointment in 4-6 weeks.    Counseling is important.  Try psychology today.    Please let me know if you have any questions.  Thank you for allowing me to participate in your care.  It was my pleasure meeting you.  Rosa Arnett PA-C                Welcome to [facility], where we are committed to the art of inspired primary care.  Thank you for choosing us to be a part of your well-being.    The clinic is open Monday through Friday, 8:00 a.m. - 5:00 p.m., and Saturdays from 8:00 a.m. - 12:00 p.m.  After-hours questions are directed to our 24-hour nurse line, which can be reached by calling the clinic at [phone].  You can also contact the clinic through [employer], our online patient portal.  (Please allow 1-2 business days for a response via [employer].)  If you are not already enrolled in [employer], access instructions are below.    If you need a refill on your prescription, please contact the pharmacy where you filled it, and they will contact our clinic with the details of what is needed.  If your prescription is a controlled substance, you will have a conversation with your provider to determine if you would like to  your prescription at the clinic or have it mailed to your pharmacy.  Please allow 2-3 business days for all refill requests to be handled.    We look forward to providing you with great care!    Patient Education     Depression  Depression is one of the most common mental health problems today. It is not just a state of unhappiness or sadness. It is a true disease. The cause seems to be related to a decrease in chemicals that transmit signals in the brain. Having a family history of depression, alcoholism, or suicide increases the risk. Chronic illness, chronic pain, migraine  headaches, and high emotional stress also increase the risk.  Depression is something we tend to recognize in others, but may have a hard time seeing in ourselves. It can show in many physical and emotional ways:    Loss of appetite    Overeating    Not being able to sleep    Sleeping too much    Tiredness not related to physical exertion    Restlessness or irritability    Slowness of movement or speech    Feeling depressed or withdrawn    Loss of interest in things you once enjoyed    Trouble concentrating, poor memory, trouble making decisions    Thoughts of harming or killing oneself, or thoughts that life is not worth living    Low self-esteem  The treatment for depression may include both medicine and psychotherapy. Antidepressants can reduce suffering and can improve the ability to function during the depressed period. Therapy can offer emotional support and help you understand emotional factors that may be causing the depression.  Home care    Ongoing care and support help people manage this disease. Find a healthcare provider and therapist who meet your needs. Seek help when you feel like you may be getting ill.    Be kind to yourself. Make it a point to do things that you enjoy (gardening, walking in nature, going to a movie). Reward yourself for small successes.    Take care of your physical body. Eat a balanced diet (low in saturated fat and high in fruits and vegetables). Exercise at least 3 times a week for 30 minutes. Even mild-moderate exercise (like brisk walking) can make you feel better.    Don't drink alcohol, which can make depression worse.    Take medicine as prescribed.    Tell each of your healthcare providers about all of the prescription and over-the-counter medicines, vitamins, and supplements you take. Certain supplements interact with medicines and can result in dangerous side effects. Ask your pharmacist when you have questions about medicine interactions.    Talk with your family  and trusted friends about your feelings and thoughts. Ask them to help you recognize behavior changes early so you can get help and, if needed, medicine can be adjusted.  Follow-up care  Follow up with your healthcare provider, or as advised.  Call 911  Call 911 if you:    Have suicidal thoughts, a suicide plan, and the means to carry out the plan; or serious thoughts of hurting someone else     Have trouble breathing    Are very confused    Feel very drowsy or have trouble awakening    Faint or lose consciousness    Have new chest pain that becomes more severe, lasts longer, or spreads into your shoulder, arm, neck, jaw, or back  When to seek medical advice  Call your healthcare provider right away if any of these happen:    Feeling extreme depression, fear, anxiety, or anger toward yourself or others    Feeling out of control    Feeling that you may try to harm yourself or another    Hearing voices that others do not hear    Seeing things that others do not see    Can t sleep or eat for 3 days in a row    Friends or family express concern over your behavior and ask you to seek help  Date Last Reviewed: 10/1/2017    2304-7701 [employer]. [address] [place] [id number]. All rights reserved. This information is not intended as a substitute for professional medical care. Always follow your healthcare professional's instructions.

## 2019-06-25 NOTE — PROGRESS NOTES
SUBJECTIVE:   Taryn Good is a 19 year old female, with a history of depression and substance abuse, who presents to clinic today for the following health issues:    Depression and Anxiety Follow-Up:  Taryn reports in the past 3 years, her sx have worsened, feeling like she can not handle her emotions.  Interestingly she has been sober for the past 3 years and is currently living in sober housing at [facility].  She states her emotions are on extreme ends of the spectrum, along with extreme mood changes.  She has been prescribed Zoloft several times, but has never felt Zoloft has helped sx. She was first diagnosed with anxiety and depression in her teens. She reports feeling tense and crying when her anxiety worsens, and notes going to the gym to relieve sx.  She believes no one prior has paid enough attention to her anxiety sx.  She has difficulty falling asleep, but reports melatonin has helped her fall asleep.  She was hospitalized for suicidal ideation when she was 15, but has not had any self-harm recently.  Her mother had anxiety and depression, maternal grandmother had depression, and paternal grandfather hx of substance abuse.  She has looked for a therapist in [place], but has had difficulty finding one.  .  Hx of abuse. She is not currently in counseling but is interested in doing this.    How are you doing with your depression since your last visit? Worsened    How are you doing with your anxiety since your last visit?  Worsened    Are you having other symptoms that might be associated with depression or anxiety? Thoughts of no one liking her or being upset up with her.    Have you had a significant life event? No    Do you have any concerns with your use of alcohol or other drugs? Yes:  Hx of alcohol abuse, states when she gets anxious or depressed she thinks about drinking     Taryn has a hx of substance abuse, and has been sober for 3 years.  She lives in a sober dorm at [facility].  She states when  she gets anxious or upset, she wants to run away from everything and drink.  She states she has never been diagnosed with anything other than depression.  She reports smoking every other day.  No recreational drug use.    She admits to having thoughts of self harm as she has been a  the past. She has no plans for suicide and does not want to die only feel better. She is not currently in counseling but is interested and motivated to find one. She will be traveling home to [place] this week for a visit and will work on this when she gets back.    She has been having recent relationship problems with 2 episodes of chlamydia from the same partner. She also has changed contraceptives over the past several months which may be making mood less stable. OCP's the nexplanon which was removed several months later and now back on OCP's.    Social History     Tobacco Use     Smoking status: Former Smoker     Types: Cigarettes     Last attempt to quit: 2018     Years since quittin.0     Smokeless tobacco: Current User     Tobacco comment: Vapes    Substance Use Topics     Alcohol use: No     Drug use: No     PHQ 6/15/2018 2018 2019   PHQ-9 Total Score 6 13 20   Q9: Thoughts of better off dead/self-harm past 2 weeks Not at all Not at all Several days   F/U: Thoughts of suicide or self-harm - - No   F/U: Safety concerns - - No     TRAVIS-7 SCORE 6/15/2018 2018 2019   Total Score 1 9 17     Suicide Assessment Five-step Evaluation and Treatment (SAFE-T)    Amount of exercise or physical activity: Goes to gym to alleviate anxiety sx.    Problems taking medications regularly: No    Medication side effects: none    Diet: regular (no restrictions)    Problem list and histories reviewed & adjusted, as indicated.  Additional history: as documented    Patient Active Problem List   Diagnosis     H/O ETOH abuse     Moderate episode of recurrent major depressive disorder (H)     Nexplanon in place     History  "reviewed. No pertinent surgical history.    Social History     Tobacco Use     Smoking status: Former Smoker     Types: Cigarettes     Last attempt to quit: 2018     Years since quittin.0     Smokeless tobacco: Current User     Tobacco comment: Vapes    Substance Use Topics     Alcohol use: No     Family History   Problem Relation Age of Onset     Depression Mother      Migraines Mother      Anxiety Disorder Mother      Depression Sister      Anxiety Disorder Sister      Depression Maternal Grandmother      Depression Sister      Anxiety Disorder Sister      Alcoholism Paternal Grandfather          Current Outpatient Medications   Medication Sig Dispense Refill     citalopram (CELEXA) 20 MG tablet Take 1 tablet (20 mg) by mouth daily Start with 1/2 tab daily for 7 days then increase. 20 tablet 1     hydrOXYzine (ATARAX) 25 MG tablet Take 1 tablet (25 mg) by mouth 3 times daily as needed for anxiety 20 tablet 0     norgestrel-ethinyl estradiol (LO/OVRAL) 0.3-30 MG-MCG tablet Take 1 tablet by mouth daily 84 tablet 3     No Known Allergies    Reviewed and updated as needed this visit by clinical staff  Tobacco  Allergies  Meds  Problems  Med Hx  Surg Hx  Fam Hx       Reviewed and updated as needed this visit by Provider  Tobacco  Allergies  Meds  Problems  Med Hx  Surg Hx  Fam Hx       ROS:  Constitutional, HEENT, cardiovascular, pulmonary, gi and gu systems are negative, except as otherwise noted.    OBJECTIVE:     /82 (BP Location: Left arm, Patient Position: Chair, Cuff Size: Adult Regular)   Pulse 76   Temp 97.9  F (36.6  C) (Oral)   Resp 16   Ht 1.768 m (5' 9.61\")   Wt 70.1 kg (154 lb 8 oz)   LMP 2019 (Exact Date)   SpO2 100%   BMI 22.42 kg/m    Body mass index is 22.42 kg/m .  GENERAL: healthy, alert and no distress  EYES: Eyes grossly normal to inspection, PERRL and conjunctivae and sclerae normal  NECK: no adenopathy, no asymmetry, masses, or scars and thyroid normal to " palpation  PSYCH: mentation appears normal, affect normal/bright. well dressed and groomed.  Good eye contact and is cooperative. Thoughts linear.  No delusions, compulsions or paranoia.  Affect anxious but not tremulous.   Patient denies homicidal and suicidal ideation as well as no thoughts or actions of self-harm.        ASSESSMENT/PLAN:       ICD-10-CM    1. Moderate episode of recurrent major depressive disorder (H) F33.1 citalopram (CELEXA) 20 MG tablet     hydrOXYzine (ATARAX) 25 MG tablet   2. Emotional lability R45.86      Risks and benefits of medication management discussed.  Different medication options discussed.  We are consciously avoiding anything with a remote concern of abuse.  Discussed need for gradual increase of SSRI dose over time, titrating to effect.  Reviewed potential for initial side effects (such as headache, GI symptoms, and dry mouth) that will likely subside after a week or so, but that therapeutic effects will likely take 1-2 weeks - so it's important to stick with medication for at least a month to adequately gauge effect.  Notify me of any significant side effects.  Discussed that treatment with SSRI medications requires a minimum commitment of 9-12 months; shorter courses are associated with rebound symptoms.  Discussed potential long-term side effects including sexual side effects.     Please send me a [employer] message in 2 weeks.  Sooner if any problems or concerns.    Use the hydroxyzine when you are feeling out of control.  It may make you a bit drowsy.    Follow up appointment in 4-6 weeks.    Counseling is important.  Try psychology today website.    See patient instructions. Self care discussed and encouraged.    30 minutes TT spent with patient with greater that 50% in counseling as outlined above.      Iva Arnett PA-C  [facility]    I, Sammy Corral, am serving as a scribe to document services personally performed by Iva Arnett PA-C,  based on data collection and the provider's statements to me. Iva Arnett PA-C, has reviewed, edited, and approve the above note.

## 2019-06-25 NOTE — NURSING NOTE
"19 year old  Chief Complaint   Patient presents with     Anxiety     Depression     Irritability     \"feels like someone pushes a button an can feel a wash of emotion going through body will last 2-3 days\" Anger mostly       Blood pressure (!) 139/99, pulse 95, temperature 97.9  F (36.6  C), temperature source Oral, resp. rate 16, height 1.768 m (5' 9.61\"), weight 70.1 kg (154 lb 8 oz), last menstrual period 2019, SpO2 100 %, not currently breastfeeding. Body mass index is 22.42 kg/m .  Patient Active Problem List   Diagnosis     H/O ETOH abuse     Moderate episode of recurrent major depressive disorder (H)     Nexplanon in place       Wt Readings from Last 2 Encounters:   19 70.1 kg (154 lb 8 oz)   19 70.8 kg (156 lb)     BP Readings from Last 3 Encounters:   19 (!) 139/99   19 118/80   19 121/79         Current Outpatient Medications   Medication     norgestrel-ethinyl estradiol (LO/OVRAL) 0.3-30 MG-MCG tablet     azithromycin (ZITHROMAX) 500 MG tablet     azithromycin (ZITHROMAX) 500 MG tablet     Current Facility-Administered Medications   Medication     etonogestrel (IMPLANON/NEXPLANON) subdermal implant 68 mg     etonogestrel (IMPLANON/NEXPLANON) subdermal implant 68 mg       Social History     Tobacco Use     Smoking status: Former Smoker     Types: Cigarettes     Last attempt to quit: 2018     Years since quittin.0     Smokeless tobacco: Current User     Tobacco comment: Vapes    Substance Use Topics     Alcohol use: No     Drug use: No       Health Maintenance Due   Topic Date Due     PREVENTIVE CARE VISIT  1998     PAP  1998     DTAP/TDAP/TD IMMUNIZATION (1 - Tdap) 2005     HPV IMMUNIZATION (1 - Female 3-dose series) 2013       No results found for: PAP      2019 1:03 PM    "

## 2019-06-26 ASSESSMENT — ANXIETY QUESTIONNAIRES: GAD7 TOTAL SCORE: 17

## 2019-06-28 PROBLEM — Z97.5 NEXPLANON IN PLACE: Chronic | Status: RESOLVED | Noted: 2019-01-28 | Resolved: 2019-06-28

## 2019-06-28 PROBLEM — Z30.41 ENCOUNTER FOR SURVEILLANCE OF CONTRACEPTIVE PILLS: Status: ACTIVE | Noted: 2019-06-28

## 2019-06-28 PROBLEM — R45.86 EMOTIONAL LABILITY: Status: ACTIVE | Noted: 2019-06-28

## 2019-07-09 ENCOUNTER — TELEPHONE (OUTPATIENT)
Dept: FAMILY MEDICINE | Facility: CLINIC | Age: 19
End: 2019-07-09

## 2019-07-09 NOTE — TELEPHONE ENCOUNTER
Routing to provider - Update on patient's symptoms, following 6/25/19 office visit.   Amber Sanchez RN  07/09/19  2:37 PM

## 2019-07-09 NOTE — TELEPHONE ENCOUNTER
[facility] Call Center    Phone Message    May a detailed message be left on voicemail: no    Reason for Call: Other: Pt reports she is feeling a little bit better after being prescribed her meds but still reports having a little anxiety left over. Please call Pt back for any questions/concerns.     Action Taken: Message routed to:  [facility]: [facility]

## 2019-07-30 ENCOUNTER — OFFICE VISIT (OUTPATIENT)
Dept: FAMILY MEDICINE | Facility: CLINIC | Age: 19
End: 2019-07-30
Payer: COMMERCIAL

## 2019-07-30 VITALS
BODY MASS INDEX: 21.9 KG/M2 | OXYGEN SATURATION: 96 % | TEMPERATURE: 97.9 F | SYSTOLIC BLOOD PRESSURE: 123 MMHG | HEIGHT: 70 IN | WEIGHT: 153 LBS | HEART RATE: 94 BPM | DIASTOLIC BLOOD PRESSURE: 83 MMHG

## 2019-07-30 DIAGNOSIS — R45.86 EMOTIONAL LABILITY: ICD-10-CM

## 2019-07-30 DIAGNOSIS — F33.1 MODERATE EPISODE OF RECURRENT MAJOR DEPRESSIVE DISORDER (H): Primary | ICD-10-CM

## 2019-07-30 RX ORDER — CITALOPRAM HYDROBROMIDE 20 MG/1
20 TABLET ORAL DAILY
Qty: 30 TABLET | Refills: 3 | Status: SHIPPED | OUTPATIENT
Start: 2019-07-30 | End: 2019-08-15

## 2019-07-30 ASSESSMENT — ANXIETY QUESTIONNAIRES
IF YOU CHECKED OFF ANY PROBLEMS ON THIS QUESTIONNAIRE, HOW DIFFICULT HAVE THESE PROBLEMS MADE IT FOR YOU TO DO YOUR WORK, TAKE CARE OF THINGS AT HOME, OR GET ALONG WITH OTHER PEOPLE: SOMEWHAT DIFFICULT
2. NOT BEING ABLE TO STOP OR CONTROL WORRYING: SEVERAL DAYS
1. FEELING NERVOUS, ANXIOUS, OR ON EDGE: SEVERAL DAYS
3. WORRYING TOO MUCH ABOUT DIFFERENT THINGS: SEVERAL DAYS
5. BEING SO RESTLESS THAT IT IS HARD TO SIT STILL: NOT AT ALL
GAD7 TOTAL SCORE: 6
7. FEELING AFRAID AS IF SOMETHING AWFUL MIGHT HAPPEN: NOT AT ALL
6. BECOMING EASILY ANNOYED OR IRRITABLE: MORE THAN HALF THE DAYS

## 2019-07-30 ASSESSMENT — MIFFLIN-ST. JEOR: SCORE: 1533

## 2019-07-30 ASSESSMENT — PATIENT HEALTH QUESTIONNAIRE - PHQ9
5. POOR APPETITE OR OVEREATING: SEVERAL DAYS
SUM OF ALL RESPONSES TO PHQ QUESTIONS 1-9: 10

## 2019-07-30 NOTE — NURSING NOTE
"19 year old  Chief Complaint   Patient presents with     Depression     pt reports she is doing better. renew Celexa        Blood pressure 123/83, pulse 94, temperature 97.9  F (36.6  C), temperature source Oral, height 1.768 m (5' 9.61\"), weight 69.4 kg (153 lb), SpO2 96 %, not currently breastfeeding. Body mass index is 22.2 kg/m .  Patient Active Problem List   Diagnosis     H/O ETOH abuse     Moderate episode of recurrent major depressive disorder (H)     Encounter for surveillance of contraceptive pills     Emotional lability       Wt Readings from Last 2 Encounters:   19 69.4 kg (153 lb)   19 70.1 kg (154 lb 8 oz)     BP Readings from Last 3 Encounters:   19 123/83   19 119/82   19 118/80         Current Outpatient Medications   Medication     citalopram (CELEXA) 20 MG tablet     hydrOXYzine (ATARAX) 25 MG tablet     norgestrel-ethinyl estradiol (LO/OVRAL) 0.3-30 MG-MCG tablet     Current Facility-Administered Medications   Medication     etonogestrel (IMPLANON/NEXPLANON) subdermal implant 68 mg     etonogestrel (IMPLANON/NEXPLANON) subdermal implant 68 mg       Social History     Tobacco Use     Smoking status: Former Smoker     Types: Cigarettes     Last attempt to quit: 2018     Years since quittin.1     Smokeless tobacco: Current User     Tobacco comment: Vapes    Substance Use Topics     Alcohol use: No     Drug use: No       Health Maintenance Due   Topic Date Due     PREVENTIVE CARE VISIT  1998     DEPRESSION ACTION PLAN  1998     PAP  1998     DTAP/TDAP/TD IMMUNIZATION (1 - Tdap) 2005     HPV IMMUNIZATION (1 - Female 3-dose series) 2013       No results found for: PAP      2019 1:10 PM  "

## 2019-07-30 NOTE — PROGRESS NOTES
Subjective     Taryn Good is a 19 year old female who presents to clinic today for the following health issues:    HPI   Depression and Anxiety Follow-Up:  Taryn is here for follow up after starting new medicatin for anxiety, depression and mood lability. Past note reviewed.  Her 2 weeks medication check she noted some improvement. She reports: feeling her medication has improved sx.  She noted some stomach pains when beginning medication, but these have been resolved.  Initially, she notes that her anxiety persisted, but then improved after a few weeks of taking her medication.  She has tried the hydroxyzine once for anxiety since her last visit, and she believes it helped to alleviate sx.  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  She recently [place], and notes it was very relaxing.    Since her last appointment she went home to visit her family on the [facility] and had a great visit. She is feeling supported.  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    She has talked with her parents about counseling and insurance, and notes she would need to pay out of pocket for counseling.  Taryn mentions that she has been contacting counselors on campus, and is planning to meet with them at school/[facility]. These counselors will also refer her to other counselors that she is able to afford.    How are you doing with your depression since your last visit? Improved    How are you doing with your anxiety since your last visit?  Improved    Are you having other symptoms that might be associated with depression or anxiety? No    Have you had a significant life event? No     Do you have any concerns with your use of alcohol or other drugs? No    Social History     Tobacco Use     Smoking status: Former Smoker     Types: Cigarettes     Last attempt to quit: 6/1/2018     Years  since quittin.1     Smokeless tobacco: Current User     Tobacco comment: Vapes    Substance Use Topics     Alcohol use: No     Drug use: No     PHQ 2018   PHQ-9 Total Score 13 20 10   Q9: Thoughts of better off dead/self-harm past 2 weeks Not at all Several days Not at all   F/U: Thoughts of suicide or self-harm - No -   F/U: Safety concerns - No -     TRAVIS-7 SCORE 2018   Total Score 9 17 6       In the past two weeks have you had thoughts of suicide or self-harm?  No.    Do you have concerns about your personal safety or the safety of others?   No    Suicide Assessment Five-step Evaluation and Treatment (SAFE-T)    Amount of exercise or physical activity: Goes to gym to alleviate anxiety sx.    Problems taking medications regularly: No    Medication side effects: none    Diet: regular (no restrictions)      Patient Active Problem List   Diagnosis     H/O ETOH abuse     Moderate episode of recurrent major depressive disorder (H)     Encounter for surveillance of contraceptive pills     Emotional lability     History reviewed. No pertinent surgical history.    Social History     Tobacco Use     Smoking status: Former Smoker     Types: Cigarettes     Last attempt to quit: 2018     Years since quittin.1     Smokeless tobacco: Current User     Tobacco comment: Vapes    Substance Use Topics     Alcohol use: No     Family History   Problem Relation Age of Onset     Depression Mother      Migraines Mother      Anxiety Disorder Mother      Depression Sister      Anxiety Disorder Sister      Depression Maternal Grandmother      Depression Sister      Anxiety Disorder Sister      Alcoholism Paternal Grandfather          Current Outpatient Medications   Medication Sig Dispense Refill     citalopram (CELEXA) 20 MG tablet Take 1 tablet (20 mg) by mouth daily 30 tablet 3     hydrOXYzine (ATARAX) 25 MG tablet Take 1 tablet (25 mg) by mouth 3 times daily as needed for  "anxiety 20 tablet 0     norgestrel-ethinyl estradiol (LO/OVRAL) 0.3-30 MG-MCG tablet Take 1 tablet by mouth daily 84 tablet 3     No Known Allergies    Reviewed and updated as needed this visit by Provider  Tobacco  Allergies  Meds  Problems  Med Hx  Surg Hx  Fam Hx         Review of Systems   ROS COMP: Constitutional, HEENT, cardiovascular, pulmonary, gi and gu systems are negative, except as otherwise noted.      Objective    /83   Pulse 94   Temp 97.9  F (36.6  C) (Oral)   Ht 1.768 m (5' 9.61\")   Wt 69.4 kg (153 lb)   SpO2 96%   BMI 22.20 kg/m    Body mass index is 22.2 kg/m .  Physical Exam   GENERAL: healthy, alert and no distress  PSYCH: well dressed and groomed.  Good eye contact and is cooperative. Thoughts linear.  No delusions, compulsions or paranoia.  Affect normal.  Patient denies homicidal and suicidal ideation as well as no thoughts or actions of self-harm.          Assessment & Plan       ICD-10-CM    1. Moderate episode of recurrent major depressive disorder (H) F33.1 citalopram (CELEXA) 20 MG tablet   2. Emotional lability R45.86      Make appointment for CPE in the near future. If not FOLLOW UP OV for depression in 3 months  Continue with medication.  Monitor for signs that dose adjustment is needed.  Continue with counseling. Praise for maintaining sobriety  Iva Arnett PA-C  [facility]    I, Sammy Corral, am serving as a scribe to document services personally performed by Iva Arnett PA-C, based on data collection and the provider's statements to me. Iva Arnett PA-C, has reviewed, edited, and approv      "

## 2019-07-31 ASSESSMENT — ANXIETY QUESTIONNAIRES: GAD7 TOTAL SCORE: 6
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
## 2019-08-15 ENCOUNTER — OFFICE VISIT (OUTPATIENT)
Dept: FAMILY MEDICINE | Facility: CLINIC | Age: 19
End: 2019-08-15
Payer: COMMERCIAL

## 2019-08-15 VITALS
BODY MASS INDEX: 23.33 KG/M2 | SYSTOLIC BLOOD PRESSURE: 127 MMHG | DIASTOLIC BLOOD PRESSURE: 76 MMHG | RESPIRATION RATE: 16 BRPM | WEIGHT: 157.5 LBS | OXYGEN SATURATION: 96 % | TEMPERATURE: 98.3 F | HEIGHT: 69 IN | HEART RATE: 74 BPM

## 2019-08-15 DIAGNOSIS — Z12.4 SCREENING FOR CERVICAL CANCER: ICD-10-CM

## 2019-08-15 DIAGNOSIS — Z23 NEED FOR VACCINATION: ICD-10-CM

## 2019-08-15 DIAGNOSIS — Z11.3 SCREEN FOR STD (SEXUALLY TRANSMITTED DISEASE): ICD-10-CM

## 2019-08-15 DIAGNOSIS — F33.1 MODERATE EPISODE OF RECURRENT MAJOR DEPRESSIVE DISORDER (H): ICD-10-CM

## 2019-08-15 DIAGNOSIS — F41.1 GAD (GENERALIZED ANXIETY DISORDER): ICD-10-CM

## 2019-08-15 DIAGNOSIS — Z00.00 ROUTINE GENERAL MEDICAL EXAMINATION AT A HEALTH CARE FACILITY: Primary | ICD-10-CM

## 2019-08-15 DIAGNOSIS — Z30.011 ENCOUNTER FOR INITIAL PRESCRIPTION OF CONTRACEPTIVE PILLS: ICD-10-CM

## 2019-08-15 RX ORDER — HYDROXYZINE HYDROCHLORIDE 25 MG/1
25 TABLET, FILM COATED ORAL 3 TIMES DAILY PRN
Qty: 20 TABLET | Refills: 0 | Status: CANCELLED | OUTPATIENT
Start: 2019-08-15

## 2019-08-15 RX ORDER — CITALOPRAM HYDROBROMIDE 20 MG/1
20 TABLET ORAL DAILY
Qty: 90 TABLET | Refills: 1 | Status: SHIPPED | OUTPATIENT
Start: 2019-08-15 | End: 2020-03-09

## 2019-08-15 SDOH — ECONOMIC STABILITY: FOOD INSECURITY: WITHIN THE PAST 12 MONTHS, YOU WORRIED THAT YOUR FOOD WOULD RUN OUT BEFORE YOU GOT MONEY TO BUY MORE.: NEVER TRUE

## 2019-08-15 SDOH — HEALTH STABILITY: MENTAL HEALTH
STRESS IS WHEN SOMEONE FEELS TENSE, NERVOUS, ANXIOUS, OR CAN'T SLEEP AT NIGHT BECAUSE THEIR MIND IS TROUBLED. HOW STRESSED ARE YOU?: ONLY A LITTLE

## 2019-08-15 SDOH — ECONOMIC STABILITY: TRANSPORTATION INSECURITY
IN THE PAST 12 MONTHS, HAS LACK OF TRANSPORTATION KEPT YOU FROM MEETINGS, WORK, OR FROM GETTING THINGS NEEDED FOR DAILY LIVING?: NO

## 2019-08-15 SDOH — HEALTH STABILITY: PHYSICAL HEALTH: ON AVERAGE, HOW MANY DAYS PER WEEK DO YOU ENGAGE IN MODERATE TO STRENUOUS EXERCISE (LIKE A BRISK WALK)?: 5 DAYS

## 2019-08-15 SDOH — ECONOMIC STABILITY: TRANSPORTATION INSECURITY
IN THE PAST 12 MONTHS, HAS THE LACK OF TRANSPORTATION KEPT YOU FROM MEDICAL APPOINTMENTS OR FROM GETTING MEDICATIONS?: NO

## 2019-08-15 SDOH — HEALTH STABILITY: PHYSICAL HEALTH: ON AVERAGE, HOW MANY MINUTES DO YOU ENGAGE IN EXERCISE AT THIS LEVEL?: 50 MIN

## 2019-08-15 SDOH — ECONOMIC STABILITY: FOOD INSECURITY: WITHIN THE PAST 12 MONTHS, THE FOOD YOU BOUGHT JUST DIDN'T LAST AND YOU DIDN'T HAVE MONEY TO GET MORE.: NEVER TRUE

## 2019-08-15 SDOH — ECONOMIC STABILITY: INCOME INSECURITY: HOW HARD IS IT FOR YOU TO PAY FOR THE VERY BASICS LIKE FOOD, HOUSING, MEDICAL CARE, AND HEATING?: NOT HARD AT ALL

## 2019-08-15 ASSESSMENT — ANXIETY QUESTIONNAIRES
2. NOT BEING ABLE TO STOP OR CONTROL WORRYING: SEVERAL DAYS
1. FEELING NERVOUS, ANXIOUS, OR ON EDGE: SEVERAL DAYS
7. FEELING AFRAID AS IF SOMETHING AWFUL MIGHT HAPPEN: NOT AT ALL
GAD7 TOTAL SCORE: 7
6. BECOMING EASILY ANNOYED OR IRRITABLE: MORE THAN HALF THE DAYS
IF YOU CHECKED OFF ANY PROBLEMS ON THIS QUESTIONNAIRE, HOW DIFFICULT HAVE THESE PROBLEMS MADE IT FOR YOU TO DO YOUR WORK, TAKE CARE OF THINGS AT HOME, OR GET ALONG WITH OTHER PEOPLE: SOMEWHAT DIFFICULT
5. BEING SO RESTLESS THAT IT IS HARD TO SIT STILL: SEVERAL DAYS
3. WORRYING TOO MUCH ABOUT DIFFERENT THINGS: SEVERAL DAYS

## 2019-08-15 ASSESSMENT — PATIENT HEALTH QUESTIONNAIRE - PHQ9
SUM OF ALL RESPONSES TO PHQ QUESTIONS 1-9: 11
5. POOR APPETITE OR OVEREATING: SEVERAL DAYS

## 2019-08-15 ASSESSMENT — MIFFLIN-ST. JEOR: SCORE: 1548.41

## 2019-08-15 NOTE — NURSING NOTE
"19 year old  Chief Complaint   Patient presents with     Physical     19 yrs old,  non fasting        Blood pressure 127/76, pulse 74, temperature 98.3  F (36.8  C), temperature source Oral, resp. rate 16, height 1.76 m (5' 9.29\"), weight 71.4 kg (157 lb 8 oz), last menstrual period 2019, SpO2 96 %, not currently breastfeeding. Body mass index is 23.06 kg/m .  Patient Active Problem List   Diagnosis     H/O ETOH abuse     Moderate episode of recurrent major depressive disorder (H)     Encounter for surveillance of contraceptive pills     Emotional lability     TRAVIS (generalized anxiety disorder)       Wt Readings from Last 2 Encounters:   08/15/19 71.4 kg (157 lb 8 oz)   19 69.4 kg (153 lb)     BP Readings from Last 3 Encounters:   08/15/19 127/76   19 123/83   19 119/82         Current Outpatient Medications   Medication     citalopram (CELEXA) 20 MG tablet     hydrOXYzine (ATARAX) 25 MG tablet     norgestrel-ethinyl estradiol (LO/OVRAL) 0.3-30 MG-MCG tablet     Current Facility-Administered Medications   Medication     etonogestrel (IMPLANON/NEXPLANON) subdermal implant 68 mg     etonogestrel (IMPLANON/NEXPLANON) subdermal implant 68 mg       Social History     Tobacco Use     Smoking status: Former Smoker     Types: Cigarettes     Last attempt to quit: 2018     Years since quittin.2     Smokeless tobacco: Current User     Tobacco comment: Vapes    Substance Use Topics     Alcohol use: No     Drug use: No       Health Maintenance Due   Topic Date Due     PREVENTIVE CARE VISIT  1998     DEPRESSION ACTION PLAN  1998     PAP  1998     DTAP/TDAP/TD IMMUNIZATION (1 - Tdap) 2005     HPV IMMUNIZATION (1 - Female 3-dose series) 2013       No results found for: PAP      August 15, 2019 12:58 PM    "

## 2019-08-15 NOTE — LETTER
My Depression Action Plan  Name: Taryn Good   Date of Birth 2000  Date: 8/15/2019    My doctor: Manisha Charlton   My clinic: [facility]  [address]  [place] [address]  [phone]          GREEN    ZONE   Good Control    What it looks like:     Things are going generally well. You have normal up s and down s. You may even feel depressed from time to time, but bad moods usually last less than a day.   What you need to do:  1. Continue to care for yourself (see self care plan)  2. Check your depression survival kit and update it as needed  3. Follow your physician s recommendations including any medication.  4. Do not stop taking medication unless you consult with your physician first.           YELLOW         ZONE Getting Worse    What it looks like:     Depression is starting to interfere with your life.     It may be hard to get out of bed; you may be starting to isolate yourself from others.    Symptoms of depression are starting to last most all day and this has happened for several days.     You may have suicidal thoughts but they are not constant.   What you need to do:     1. Call your care team, your response to treatment will improve if you keep your care team informed of your progress. Yellow periods are signs an adjustment may need to be made.     2. Continue your self-care, even if you have to fake it!    3. Talk to someone in your support network    4. Open up your depression survival kit           RED    ZONE Medical Alert - Get Help    What it looks like:     Depression is seriously interfering with your life.     You may experience these or other symptoms: You can t get out of bed most days, can t work or engage in other necessary activities, you have trouble taking care of basic hygiene, or basic responsibilities, thoughts of suicide or death that will not go away, self-injurious behavior.     What you need to  do:  1. Call your care team and request a same-day appointment. If they are not available (weekends or after hours) call your local crisis line, emergency room or 911.            Depression Self Care Plan / Survival Kit    Self-Care for Depression  Here s the deal. Your body and mind are really not as separate as most people think.  What you do and think affects how you feel and how you feel influences what you do and think. This means if you do things that people who feel good do, it will help you feel better.  Sometimes this is all it takes.  There is also a place for medication and therapy depending on how severe your depression is, so be sure to consult with your medical provider and/ or Behavioral Health Consultant if your symptoms are worsening or not improving.     In order to better manage my stress, I will:    Exercise  Get some form of exercise, every day. This will help reduce pain and release endorphins, the  feel good  chemicals in your brain. This is almost as good as taking antidepressants!  This is not the same as joining a gym and then never going! (they count on that by the way ) It can be as simple as just going for a walk or doing some gardening, anything that will get you moving.      Hygiene   Maintain good hygiene (Get out of bed in the morning, Make your bed, Brush your teeth, Take a shower, and Get dressed like you were going to work, even if you are unemployed).  If your clothes don't fit try to get ones that do.    Diet  I will strive to eat foods that are good for me, drink plenty of water, and avoid excessive sugar, caffeine, alcohol, and other mood-altering substances.  Some foods that are helpful in depression are: complex carbohydrates, B vitamins, flaxseed, fish or fish oil, fresh fruits and vegetables.    Psychotherapy  I agree to participate in Individual Therapy (if recommended).    Medication  If prescribed medications, I agree to take them.  Missing doses can result in serious  side effects.  I understand that drinking alcohol, or other illicit drug use, may cause potential side effects.  I will not stop my medication abruptly without first discussing it with my provider.    Staying Connected With Others  I will stay in touch with my friends, family members, and my primary care provider/team.    Use your imagination  Be creative.  We all have a creative side; it doesn t matter if it s oil painting, sand castles, or mud pies! This will also kick up the endorphins.    Witness Beauty  (AKA stop and smell the roses) Take a look outside, even in mid-winter. Notice colors, textures. Watch the squirrels and birds.     Service to others  Be of service to others.  There is always someone else in need.  By helping others we can  get out of ourselves  and remember the really important things.  This also provides opportunities for practicing all the other parts of the program.    Humor  Laugh and be silly!  Adjust your TV habits for less news and crime-drama and more comedy.    Control your stress  Try breathing deep, massage therapy, biofeedback, and meditation. Find time to relax each day.     My support system    Clinic Contact:  Phone number:    Contact 1:  Phone number:    Contact 2:  Phone number:    Jew/:  Phone number:    Therapist:  Phone number:    Local crisis center:    Phone number:    Other community support:  Phone number:
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
## 2019-08-16 LAB
C TRACH DNA SPEC QL NAA+PROBE: NEGATIVE
HIV 1+2 AB+HIV1 P24 AG SERPL QL IA: NONREACTIVE
N GONORRHOEA DNA SPEC QL NAA+PROBE: NEGATIVE
SPECIMEN SOURCE: NORMAL
SPECIMEN SOURCE: NORMAL
T PALLIDUM AB SER QL: NONREACTIVE

## 2019-08-16 ASSESSMENT — ANXIETY QUESTIONNAIRES: GAD7 TOTAL SCORE: 7

## 2019-08-19 LAB
COPATH REPORT: NORMAL
PAP: NORMAL

## 2020-02-17 ENCOUNTER — OFFICE VISIT (OUTPATIENT)
Dept: FAMILY MEDICINE | Facility: CLINIC | Age: 20
End: 2020-02-17
Payer: COMMERCIAL

## 2020-02-17 VITALS
SYSTOLIC BLOOD PRESSURE: 120 MMHG | DIASTOLIC BLOOD PRESSURE: 79 MMHG | HEIGHT: 69 IN | TEMPERATURE: 97 F | BODY MASS INDEX: 22.66 KG/M2 | WEIGHT: 153 LBS | OXYGEN SATURATION: 98 % | RESPIRATION RATE: 16 BRPM | HEART RATE: 82 BPM

## 2020-02-17 DIAGNOSIS — N39.0 URINARY TRACT INFECTION WITHOUT HEMATURIA, SITE UNSPECIFIED: ICD-10-CM

## 2020-02-17 DIAGNOSIS — R81 GLUCOSURIA: ICD-10-CM

## 2020-02-17 DIAGNOSIS — R35.0 URINARY FREQUENCY: Primary | ICD-10-CM

## 2020-02-17 LAB
ALBUMIN SERPL-MCNC: 3.8 G/DL (ref 3.3–4.6)
ALP SERPL-CCNC: 48 U/L (ref 40–150)
ALT SERPL-CCNC: 15 U/L (ref 0–50)
AST SERPL-CCNC: 26 U/L (ref 0–45)
BILIRUB SERPL-MCNC: 0.5 MG/DL (ref 0.2–1.3)
BILIRUBIN UR: ABNORMAL MG/DL
BLOOD UR: NEGATIVE MG/DL
BUN SERPL-MCNC: 15 MG/DL (ref 5–24)
CALCIUM SERPL-MCNC: 9.5 MG/DL (ref 8.5–10.4)
CHLORIDE SERPLBLD-SCNC: 108 MMOL/L (ref 94–109)
CO2 SERPL-SCNC: 27 MMOL/L (ref 20–32)
CREAT SERPL-MCNC: 0.9 MG/DL (ref 0.6–1.3)
EGFR CALCULATED (BLACK REFERENCE): 101.6
EGFR CALCULATED (NON BLACK REFERENCE): 84
GLUCOSE SERPL-MCNC: 91 MG/DL (ref 60–99)
GLUCOSE URINE: ABNORMAL
HBA1C MFR BLD: 5.2 % (ref 4.1–5.7)
HCG UR QL: NEGATIVE
KETONES UR QL: ABNORMAL MG/DL
LEUKOCYTE ESTERASE UR: NEGATIVE
NITRITE UR QL STRIP: POSITIVE MG/DL
PH UR STRIP: 5.5 [PH] (ref 4.5–8)
POTASSIUM SERPL-SCNC: 4.5 MMOL/L (ref 3.4–5.3)
PROT SERPL-MCNC: 7 G/DL (ref 6.8–8.8)
PROTEIN UR: ABNORMAL MG/DL
SODIUM SERPL-SCNC: 141 MMOL/L (ref 137.3–146.3)
SP GR UR STRIP: >=1.03 (ref 1–1)
UROBILINOGEN UR STRIP-ACNC: ABNORMAL E.U./DL

## 2020-02-17 RX ORDER — CEPHALEXIN 500 MG/1
500 CAPSULE ORAL 4 TIMES DAILY
Qty: 20 CAPSULE | Refills: 0 | Status: SHIPPED | OUTPATIENT
Start: 2020-02-17 | End: 2020-03-09

## 2020-02-17 ASSESSMENT — MIFFLIN-ST. JEOR: SCORE: 1526.12

## 2020-02-17 NOTE — PATIENT INSTRUCTIONS
ASSESSMENT/PLAN:    Taryn had Urinary frequency with apparent infection that was not treated successfully with Nitrofurantoin.   Also with a possible low grade cellulitis on face.     Given the urinary frequency, and some glucosuria, evaluated for diabetes and found to be negative.     Will treat with Keflex as this should cover both the urine and the skin.     Increase fluids  Return to clinic if no improvement in 3-7 days, sooner if worse    Still awaiting GC/ Chlamydia labs      --Dell Sanchez MD  [facility], Department of Family Medicine and Community Health

## 2020-02-17 NOTE — NURSING NOTE
"19 year old  Chief Complaint   Patient presents with     UTI     frequency and \"uncomfortable\" x 4 days, taking nitrofurantoin x 3 days and AZO, not getting any better     Eye Problem     last night right eye swelling, better after benadryl,        Blood pressure 120/79, pulse 82, temperature 97  F (36.1  C), temperature source Oral, resp. rate 16, height 1.757 m (5' 9.17\"), weight 69.4 kg (153 lb), last menstrual period 2020, SpO2 98 %, not currently breastfeeding. Body mass index is 22.48 kg/m .  Patient Active Problem List   Diagnosis     H/O ETOH abuse     Moderate episode of recurrent major depressive disorder (H)     Encounter for surveillance of contraceptive pills     Emotional lability     TRAVIS (generalized anxiety disorder)       Wt Readings from Last 2 Encounters:   20 69.4 kg (153 lb)   08/15/19 71.4 kg (157 lb 8 oz)     BP Readings from Last 3 Encounters:   20 120/79   08/15/19 127/76   19 123/83         Current Outpatient Medications   Medication     citalopram (CELEXA) 20 MG tablet     hydrOXYzine (ATARAX) 25 MG tablet     norgestrel-ethinyl estradiol (LO/OVRAL) 0.3-30 MG-MCG tablet     Current Facility-Administered Medications   Medication     etonogestrel (IMPLANON/NEXPLANON) subdermal implant 68 mg     etonogestrel (IMPLANON/NEXPLANON) subdermal implant 68 mg       Social History     Tobacco Use     Smoking status: Former Smoker     Types: Cigarettes     Last attempt to quit: 2018     Years since quittin.7     Smokeless tobacco: Current User     Tobacco comment: Vapes    Substance Use Topics     Alcohol use: No     Drug use: No       Health Maintenance Due   Topic Date Due     DTAP/TDAP/TD IMMUNIZATION (1 - Tdap) 2005     HPV IMMUNIZATION (1 - Female 2-dose series) 2009     INFLUENZA VACCINE (1) 2019     PHQ-9  02/15/2020       Lab Results   Component Value Date    PAP NIL 08/15/2019         2020 3:41 PM    "

## 2020-02-17 NOTE — PROGRESS NOTES
"Taryn Good is a 19 year old female who presents to [facility] today with concerns of a UTI.   Developed symptoms on Feb 13, about 4 days ago. Went to [facility] clinic on Feb 14. Had UA. Given Nitrofurantoin.     Symptoms of UTI has not changed. Taking Phenazopyridine, but without relief in pain.     Also, last night had a swollen, red skin near  RIGHT eye. Took Benadryl with some improvement.     LMP - was about 2 weeks ago.     Review Of Systems:  Has otherwise been in usual state of health, e.g.   Cardiovascular: negative  Respiratory: No shortness of breath, dyspnea on exertion, cough, or hemoptysis  Gastrointestinal: negative      Problem list per EMR:  Patient Active Problem List   Diagnosis     H/O ETOH abuse     Moderate episode of recurrent major depressive disorder (H)     Encounter for surveillance of contraceptive pills     Emotional lability     TRAVIS (generalized anxiety disorder)       Current Outpatient Medications   Medication Sig Dispense Refill     cephALEXin (KEFLEX) 500 MG capsule Take 1 capsule (500 mg) by mouth 4 times daily 20 capsule 0     citalopram (CELEXA) 20 MG tablet Take 1 tablet (20 mg) by mouth daily 90 tablet 1     hydrOXYzine (ATARAX) 25 MG tablet Take 1 tablet (25 mg) by mouth 3 times daily as needed for anxiety 20 tablet 0     norgestrel-ethinyl estradiol (LO/OVRAL) 0.3-30 MG-MCG tablet Take 1 tablet by mouth daily 84 tablet 4       No Known Allergies     Social:   Student at [employer].     EXAM    Vitals: /79 (BP Location: Right arm, Patient Position: Sitting, Cuff Size: Adult Regular)   Pulse 82   Temp 97  F (36.1  C) (Oral)   Resp 16   Ht 1.757 m (5' 9.17\")   Wt 69.4 kg (153 lb)   LMP 01/27/2020 (Approximate)   SpO2 98%   BMI 22.48 kg/m    BMI= Body mass index is 22.48 kg/m .  Well-appearing 19-year-old in no apparent distress.    There is a very faint swelling around the upper right eyelid.  No redness.  The eye itself moves in all directions freely and " fundi are normal.  Conjunctiva are clear.  Neck is supple without lymphadenopathy.  Oropharynx is clear.    Cardiovascular regular rate and rhythm.  Lungs clear to auscultation.  Flanks are nontender.  Abdomen is nontender.      Results for orders placed or performed in visit on 02/17/20   Urinalysis ([facility])     Status: Abnormal   Result Value Ref Range    Leukocyte Esterase UR Negative Negative    Nitrite Urine Positive (A) Negative mg/dL    Protein UR 1+ (A) Negative mg/dL    Glucose Urine Trace (A) Negative    Ketones Urine 1+ (A) Negative mg/dL    Urobilinogen mg/dL 1.0 E.U./dL (A) 0.2 E.U./dL E.U./dL    Bilirubin UR 2+ (A) Negative mg/dL    Blood UR Negative Negative mg/dL    pH Urine 5.5 4.5 - 8.0    Specific Gravity Urine >=1.030 1.0 - 1.0   Comprehensive Metabolic Panel ([facility])     Status: None   Result Value Ref Range    Glucose 91.0 60.0 - 99.0 mg/dL    Urea Nitrogen 15.0 5.0 - 24.0 mg/dL    Calcium 9.5 8.5 - 10.4 mg/dL    Creatinine 0.9 0.6 - 1.3 mg/dL    eGFR Calculated (Non Black Reference) 84.0 >60.0    eGFR Calculated (Black Reference) 101.6 >60.0    Sodium 141.0 137.3 - 146.3 mmol/L    Potassium 4.5 3.4 - 5.3 mmol/L    Chloride 108.0 94.0 - 109.0 mmol/L    Carbon Dioxide 27.0 20.0 - 32.0 mmol/L    Albumin 3.8 3.3 - 4.6 g/dL    Alkaline Phosphatase 48.0 40.0 - 150.0 U/L    ALT 15.0 0.0 - 50.0 U/L    AST 26.0 0.0 - 45.0 U/L    Bilirubin Total 0.5 0.2 - 1.3 mg/dL    Protein Total 7.0 6.8 - 8.8 g/dL   Hemoglobin A1c ([facility])     Status: None   Result Value Ref Range    Hemoglobin A1C 5.2 4.1 - 5.7 %   HCG Qualitative Urine ([facility])     Status: None   Result Value Ref Range    HCG Qual Urine Negative Negative       ASSESSMENT/PLAN:    Taryn had Urinary frequency with apparent infection that was not treated successfully with Nitrofurantoin.   Also with a possible low grade cellulitis on face.     Given the urinary frequency, and some glucosuria, evaluated for diabetes and found to be negative.      Will treat with Keflex as this should cover both the urine and the skin.     Increase fluids  Return to clinic if no improvement in 3-7 days, sooner if worse  Still awaiting GC/ Chlamydia labs    Over 50% of the 25 minutes in room time was spent discussing the above treatment and diagnostic plan    --Dell Sanchez MD  [facility], Department of Family Medicine and Community Health

## 2020-02-18 LAB
BACTERIA SPEC CULT: NO GROWTH
SPECIMEN SOURCE: NORMAL

## 2020-02-24 ENCOUNTER — MYC REFILL (OUTPATIENT)
Dept: FAMILY MEDICINE | Facility: CLINIC | Age: 20
End: 2020-02-24

## 2020-02-24 DIAGNOSIS — F33.1 MODERATE EPISODE OF RECURRENT MAJOR DEPRESSIVE DISORDER (H): ICD-10-CM

## 2020-02-25 RX ORDER — HYDROXYZINE HYDROCHLORIDE 25 MG/1
25 TABLET, FILM COATED ORAL 3 TIMES DAILY PRN
Qty: 20 TABLET | Refills: 0 | Status: SHIPPED | OUTPATIENT
Start: 2020-02-25 | End: 2020-06-25

## 2020-02-25 NOTE — TELEPHONE ENCOUNTER
Last Office Visit:  last general exam 8/15/19 with Rosa Arnett  [facility] Appointments: none  Medication last refilled: 6/25/19 #20 + 0 refills    Prescription approved per [facility] Refill Protocol.    Amber Sanchez RN  02/25/20  11:52 AM

## 2020-03-09 DIAGNOSIS — F33.1 MODERATE EPISODE OF RECURRENT MAJOR DEPRESSIVE DISORDER (H): ICD-10-CM

## 2020-03-09 RX ORDER — CITALOPRAM HYDROBROMIDE 20 MG/1
20 TABLET ORAL DAILY
Qty: 90 TABLET | Refills: 0 | Status: SHIPPED | OUTPATIENT
Start: 2020-03-09 | End: 2020-06-25

## 2020-03-09 NOTE — TELEPHONE ENCOUNTER
Last visit 2/17/20; no future visit   Medication is being filled for 1 time refill only due to:  Patient needs to be seen because needs followup and PHQ9.   Leyla Leal RN  [facility]

## 2020-06-24 DIAGNOSIS — F33.1 MODERATE EPISODE OF RECURRENT MAJOR DEPRESSIVE DISORDER (H): ICD-10-CM

## 2020-06-24 RX ORDER — CITALOPRAM HYDROBROMIDE 20 MG/1
20 TABLET ORAL DAILY
Qty: 90 TABLET | Refills: 0 | Status: CANCELLED | OUTPATIENT
Start: 2020-06-24

## 2020-06-24 NOTE — TELEPHONE ENCOUNTER
Last time prescribed: 3/9/2020 , 90 tabs x 0 refills  Last office visit: 2/17/2020  Next appointment: No future appointments       Call to pt - appt made for tomorrow 6/25 with Rosa Arnett, for refilling med.  Leyla Leal RN  [facility]

## 2020-06-25 ENCOUNTER — VIRTUAL VISIT (OUTPATIENT)
Dept: FAMILY MEDICINE | Facility: CLINIC | Age: 20
End: 2020-06-25
Payer: COMMERCIAL

## 2020-06-25 VITALS — HEIGHT: 69 IN | BODY MASS INDEX: 23.4 KG/M2 | WEIGHT: 158 LBS

## 2020-06-25 DIAGNOSIS — F33.1 MODERATE EPISODE OF RECURRENT MAJOR DEPRESSIVE DISORDER (H): ICD-10-CM

## 2020-06-25 DIAGNOSIS — R45.86 EMOTIONAL LABILITY: ICD-10-CM

## 2020-06-25 DIAGNOSIS — F41.1 GAD (GENERALIZED ANXIETY DISORDER): Primary | ICD-10-CM

## 2020-06-25 RX ORDER — HYDROXYZINE HYDROCHLORIDE 25 MG/1
25 TABLET, FILM COATED ORAL 3 TIMES DAILY PRN
Qty: 30 TABLET | Refills: 1 | Status: SHIPPED | OUTPATIENT
Start: 2020-06-25 | End: 2021-01-20

## 2020-06-25 RX ORDER — CITALOPRAM HYDROBROMIDE 20 MG/1
20 TABLET ORAL DAILY
Qty: 90 TABLET | Refills: 1 | Status: SHIPPED | OUTPATIENT
Start: 2020-06-25 | End: 2021-01-20

## 2020-06-25 ASSESSMENT — ANXIETY QUESTIONNAIRES
1. FEELING NERVOUS, ANXIOUS, OR ON EDGE: MORE THAN HALF THE DAYS
GAD7 TOTAL SCORE: 7
7. FEELING AFRAID AS IF SOMETHING AWFUL MIGHT HAPPEN: NOT AT ALL
IF YOU CHECKED OFF ANY PROBLEMS ON THIS QUESTIONNAIRE, HOW DIFFICULT HAVE THESE PROBLEMS MADE IT FOR YOU TO DO YOUR WORK, TAKE CARE OF THINGS AT HOME, OR GET ALONG WITH OTHER PEOPLE: SOMEWHAT DIFFICULT
5. BEING SO RESTLESS THAT IT IS HARD TO SIT STILL: SEVERAL DAYS
3. WORRYING TOO MUCH ABOUT DIFFERENT THINGS: SEVERAL DAYS
6. BECOMING EASILY ANNOYED OR IRRITABLE: MORE THAN HALF THE DAYS
2. NOT BEING ABLE TO STOP OR CONTROL WORRYING: SEVERAL DAYS

## 2020-06-25 ASSESSMENT — PATIENT HEALTH QUESTIONNAIRE - PHQ9
SUM OF ALL RESPONSES TO PHQ QUESTIONS 1-9: 9
5. POOR APPETITE OR OVEREATING: NOT AT ALL

## 2020-06-25 ASSESSMENT — MIFFLIN-ST. JEOR: SCORE: 1541.06

## 2020-06-25 NOTE — PROGRESS NOTES
"Family Medicine Telephone Visit Note  Consent and telephone details are below    Subjective     Taryn Good is a 20 year old female who presents to clinic today for the following health issues:    Chief Complaint   Patient presents with     Recheck Medication     medication refill , citalopram, hyroxyzine        HPI   Depression and Anxiety Follow-Up:  Last OV 2019. 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  She has been sober for a couple years and doing well. She was doing well on her medication until she ran out of medication. She tried calling in to the clinic several times but was not able to get through to the call center. She has been without her medication for 7 days.  In addition to withdrawal symptoms from the medication, headache and brain \"flashes\" she has been feeling out of control emotionally.  She describes irritability, crying and panic attacks.  Much of this had gone away with medication.  She has been in counseling consistently until the COVID pandemic and her school closing. She is hoping to start up again soon.  Insomnia has been worse since she has been off her medication.  This is an issue most of the time and has been her whole life. She occasionally takes melatonin.  She had one panic attack in the past week and that was the first one she has had in awhile.  Exercise:  Yes doing well with this.  Several days per week  Diet:  Generally healthy    How are you doing with your depression since your last visit? Worsened     How are you doing with your anxiety since your last visit?  Worsened     Are you having other symptoms that might be associated with depression or anxiety? No    Have you had a significant life event? OTHER: COVID     Do you have any concerns with your use of alcohol or other drugs? No    Social History     Tobacco Use     Smoking status: Former Smoker     Types: Cigarettes     Last attempt to quit: 2018     Years since quittin.0     Smokeless tobacco: " Current User     Tobacco comment: Vapes    Substance Use Topics     Alcohol use: No     Drug use: No     PHQ 2019 8/15/2019 2020   PHQ-9 Total Score 10 11 9   Q9: Thoughts of better off dead/self-harm past 2 weeks Not at all Not at all Not at all   F/U: Thoughts of suicide or self-harm - - -   F/U: Safety concerns - - -     TRAVIS-7 SCORE 2019 8/15/2019 2020   Total Score 6 7 7       In the past two weeks have you had thoughts of suicide or self-harm?  No.    Do you have concerns about your personal safety or the safety of others?   No        How many servings of fruits and vegetables do you eat daily?  2-3    On average, how many sweetened beverages do you drink each day (Examples: soda, juice, sweet tea, etc.  Do NOT count diet or artificially sweetened beverages)?   0    How many days per week do you exercise enough to make your heart beat faster? 5    How many minutes a day do you exercise enough to make your heart beat faster? 30 - 60    How many days per week do you miss taking your medication? 0      Patient Active Problem List   Diagnosis     H/O ETOH abuse     Moderate episode of recurrent major depressive disorder (H)     Encounter for surveillance of contraceptive pills     Emotional lability     TRAVIS (generalized anxiety disorder)     History reviewed. No pertinent surgical history.    Social History     Tobacco Use     Smoking status: Former Smoker     Types: Cigarettes     Last attempt to quit: 2018     Years since quittin.0     Smokeless tobacco: Current User     Tobacco comment: Vapes    Substance Use Topics     Alcohol use: No     Family History   Problem Relation Age of Onset     Depression Mother      Migraines Mother      Anxiety Disorder Mother      Depression Sister      Anxiety Disorder Sister      Depression Maternal Grandmother      Depression Sister      Anxiety Disorder Sister      Alcoholism Paternal Grandfather          Current Outpatient Medications   Medication  "Sig Dispense Refill     citalopram (CELEXA) 20 MG tablet Take 1 tablet (20 mg) by mouth daily 90 tablet 1     hydrOXYzine (ATARAX) 25 MG tablet Take 1 tablet (25 mg) by mouth 3 times daily as needed for anxiety 30 tablet 1     norgestrel-ethinyl estradiol (LO/OVRAL) 0.3-30 MG-MCG tablet Take 1 tablet by mouth daily 84 tablet 4         Reviewed and updated as needed this visit by Provider  Tobacco  Allergies  Meds  Problems  Med Hx  Surg Hx  Fam Hx         Review of Systems   Constitutional, HEENT, cardiovascular, pulmonary, gi and gu systems are negative, except as otherwise noted.      Objective    Ht 1.753 m (5' 9\")   Wt 71.7 kg (158 lb)   LMP 05/03/2020   BMI 23.33 kg/m    Body mass index is 23.33 kg/m .  General:healthy, alert and no distress  Psych: Alert and oriented times 3; coherent speech, normal   rate and volume, able to articulate logical thoughts, able   to abstract reason, no tangential thoughts, no hallucinations   or delusions  Her affect is bright       Assessment/Plan:    ICD-10-CM    1. TRAVIS (generalized anxiety disorder)  F41.1    2. Moderate episode of recurrent major depressive disorder (H)  F33.1 citalopram (CELEXA) 20 MG tablet     hydrOXYzine (ATARAX) 25 MG tablet   3. Emotional lability  R45.86      Patient Instructions   Restart medication.  Use hydroxyzine as needed for anxiety and can use this at night for sleep if needed.  Continue with melatonin.  Ask your counselor about sleep hygiene.    Limit caffeine.    Please send me a [employer] message in 2 weeks to let me now how you are doing.  Follow up sooner if any concerns.    Please follow up in 6 months but sooner if things do not get back to normal for you in the next 1-2 weeks.  Rosa Arnett PA-C    TELEPHONE VISIT DETAILS and Consent  Taryn Good is a 20 year old female who is being evaluated via a billable telephone visit.      The patient has been notified of following:     \"This " "telephone visit will be conducted via a call between you and your physician/provider. We have found that certain health care needs can be provided without the need for a physical exam.  This service lets us provide the care you need with a short phone conversation.  If a prescription is necessary we can send it directly to your pharmacy.  If lab work is needed we can place an order for that and you can then stop by our lab to have the test done at a later time.    Telephone visits are billed at different rates depending on your insurance coverage. During this emergency period, for some insurers they may be billed the same as an in-person visit.  Please reach out to your insurance provider with any questions.    If during the course of the call the physician/provider feels a telephone visit is not appropriate, you will not be charged for this service.\"    Patient has given verbal consent for Telephone visit?  Yes    How would you like to obtain your AVS? [employer]    After Visit Information:  Patient chose to view AVS via [employer]    Appointment start time: 10: 43 am  Appointment end time: 11:01AM  Phone call duration:  18 minutes              "

## 2020-06-25 NOTE — PATIENT INSTRUCTIONS
Restart medication.  Use hydroxyzine as needed for anxiety and can use this at night for sleep if needed.  Continue with melatonin.  Ask your counselor about sleep hygiene.    Limit caffeine.    Please send me a [email or web address] message in 2 weeks to let me now how you are doing.  Follow up sooner if any concerns.    Please follow up in 6 months but sooner if things do not get back to normal for you in the next 1-2 weeks.  Rosa Arnett PA-C

## 2020-06-26 ASSESSMENT — ANXIETY QUESTIONNAIRES: GAD7 TOTAL SCORE: 7

## 2020-09-17 ENCOUNTER — MYC MEDICAL ADVICE (OUTPATIENT)
Dept: FAMILY MEDICINE | Facility: CLINIC | Age: 20
End: 2020-09-17

## 2020-09-17 DIAGNOSIS — Z30.011 ENCOUNTER FOR INITIAL PRESCRIPTION OF CONTRACEPTIVE PILLS: ICD-10-CM

## 2020-09-18 NOTE — TELEPHONE ENCOUNTER
Last seen 6/25/20 virtual, no future appt noted   Pap due 8/15/22    Prescription approved per [facility] Refill Protocol.     Blanche Grey RN  September 18, 2020 3:05 PM

## 2021-01-20 ENCOUNTER — OFFICE VISIT (OUTPATIENT)
Dept: FAMILY MEDICINE | Facility: CLINIC | Age: 21
End: 2021-01-20
Payer: COMMERCIAL

## 2021-01-20 VITALS
BODY MASS INDEX: 22.74 KG/M2 | SYSTOLIC BLOOD PRESSURE: 111 MMHG | OXYGEN SATURATION: 99 % | HEART RATE: 105 BPM | DIASTOLIC BLOOD PRESSURE: 77 MMHG | HEIGHT: 69 IN | TEMPERATURE: 97 F | WEIGHT: 153.5 LBS | RESPIRATION RATE: 14 BRPM

## 2021-01-20 DIAGNOSIS — R45.86 EMOTIONAL LABILITY: ICD-10-CM

## 2021-01-20 DIAGNOSIS — R11.0 NAUSEA: ICD-10-CM

## 2021-01-20 DIAGNOSIS — Z30.41 ENCOUNTER FOR SURVEILLANCE OF CONTRACEPTIVE PILLS: ICD-10-CM

## 2021-01-20 DIAGNOSIS — F33.1 MODERATE EPISODE OF RECURRENT MAJOR DEPRESSIVE DISORDER (H): ICD-10-CM

## 2021-01-20 DIAGNOSIS — F41.1 GAD (GENERALIZED ANXIETY DISORDER): ICD-10-CM

## 2021-01-20 DIAGNOSIS — R19.8 ALTERED BOWEL FUNCTION: ICD-10-CM

## 2021-01-20 DIAGNOSIS — R10.13 ABDOMINAL PAIN, EPIGASTRIC: Primary | ICD-10-CM

## 2021-01-20 LAB
% GRANULOCYTES: 57.2 %G (ref 40–75)
ALBUMIN SERPL-MCNC: 3.8 G/DL (ref 3.4–5)
ALP SERPL-CCNC: 68 U/L (ref 40–150)
ALT SERPL W P-5'-P-CCNC: 18 U/L (ref 0–50)
ANION GAP SERPL CALCULATED.3IONS-SCNC: 5 MMOL/L (ref 3–14)
AST SERPL W P-5'-P-CCNC: 16 U/L (ref 0–45)
BILIRUB SERPL-MCNC: 0.3 MG/DL (ref 0.2–1.3)
BUN SERPL-MCNC: 13 MG/DL (ref 7–30)
CALCIUM SERPL-MCNC: 9.2 MG/DL (ref 8.5–10.1)
CHLORIDE SERPL-SCNC: 109 MMOL/L (ref 94–109)
CO2 SERPL-SCNC: 25 MMOL/L (ref 20–32)
CREAT SERPL-MCNC: 0.75 MG/DL (ref 0.52–1.04)
CRP SERPL-MCNC: 10 MG/L (ref 0–8)
ERYTHROCYTE [DISTWIDTH] IN BLOOD BY AUTOMATED COUNT: 12.7 %
GFR SERPL CREATININE-BSD FRML MDRD: >90 ML/MIN/{1.73_M2}
GLUCOSE SERPL-MCNC: 75 MG/DL (ref 70–99)
GRANULOCYTES #: 3.4 K/UL (ref 1.6–8.3)
HCT VFR BLD AUTO: 41 % (ref 35–47)
HEMOGLOBIN: 13.1 G/DL (ref 11.7–15.7)
LYMPHOCYTES # BLD AUTO: 1.9 K/UL (ref 0.8–5.3)
LYMPHOCYTES NFR BLD AUTO: 31.8 %L (ref 20–48)
MCH RBC QN AUTO: 28.1 PG (ref 26.5–35)
MCHC RBC AUTO-ENTMCNC: 32 G/DL (ref 32–36)
MCV RBC AUTO: 88 FL (ref 78–100)
MID #: 0.6 K/UL (ref 0–2.2)
MID %: 11 %M (ref 0–20)
PLATELET # BLD AUTO: 311 K/UL (ref 150–450)
POTASSIUM SERPL-SCNC: 4.4 MMOL/L (ref 3.4–5.3)
PROT SERPL-MCNC: 7.9 G/DL (ref 6.8–8.8)
RBC # BLD AUTO: 4.66 M/UL (ref 3.8–5.2)
SODIUM SERPL-SCNC: 140 MMOL/L (ref 133–144)
TSH SERPL DL<=0.005 MIU/L-ACNC: 1.59 MU/L (ref 0.4–4)
WBC # BLD AUTO: 5.9 K/UL (ref 4–11)

## 2021-01-20 RX ORDER — CITALOPRAM HYDROBROMIDE 20 MG/1
20 TABLET ORAL DAILY
Qty: 90 TABLET | Refills: 1 | Status: SHIPPED | OUTPATIENT
Start: 2021-01-20 | End: 2021-12-14 | Stop reason: DRUGHIGH

## 2021-01-20 RX ORDER — HYDROXYZINE HYDROCHLORIDE 25 MG/1
25 TABLET, FILM COATED ORAL 3 TIMES DAILY PRN
Qty: 30 TABLET | Refills: 1 | Status: SHIPPED | OUTPATIENT
Start: 2021-01-20 | End: 2021-12-14

## 2021-01-20 ASSESSMENT — ANXIETY QUESTIONNAIRES
GAD7 TOTAL SCORE: 5
3. WORRYING TOO MUCH ABOUT DIFFERENT THINGS: SEVERAL DAYS
1. FEELING NERVOUS, ANXIOUS, OR ON EDGE: NOT AT ALL
7. FEELING AFRAID AS IF SOMETHING AWFUL MIGHT HAPPEN: NOT AT ALL
2. NOT BEING ABLE TO STOP OR CONTROL WORRYING: NOT AT ALL
6. BECOMING EASILY ANNOYED OR IRRITABLE: SEVERAL DAYS
IF YOU CHECKED OFF ANY PROBLEMS ON THIS QUESTIONNAIRE, HOW DIFFICULT HAVE THESE PROBLEMS MADE IT FOR YOU TO DO YOUR WORK, TAKE CARE OF THINGS AT HOME, OR GET ALONG WITH OTHER PEOPLE: SOMEWHAT DIFFICULT
5. BEING SO RESTLESS THAT IT IS HARD TO SIT STILL: MORE THAN HALF THE DAYS

## 2021-01-20 ASSESSMENT — PATIENT HEALTH QUESTIONNAIRE - PHQ9
5. POOR APPETITE OR OVEREATING: SEVERAL DAYS
SUM OF ALL RESPONSES TO PHQ QUESTIONS 1-9: 9

## 2021-01-20 ASSESSMENT — MIFFLIN-ST. JEOR: SCORE: 1520.9

## 2021-01-20 NOTE — NURSING NOTE
"20 year old  Chief Complaint   Patient presents with     Depression     recheck and refill medication     Gastrointestinal Problem     pt reports every since she got Covid back in Sept her stomach hasnt been doing very well. She reports sometimes she randomly feels like she has to vommit but never actually does.       Blood pressure 111/77, pulse 105, temperature 97  F (36.1  C), resp. rate 14, height 1.753 m (5' 9.02\"), weight 69.6 kg (153 lb 8 oz), last menstrual period 2020, SpO2 99 %, not currently breastfeeding. Body mass index is 22.66 kg/m .  Patient Active Problem List   Diagnosis     H/O ETOH abuse     Moderate episode of recurrent major depressive disorder (H)     Encounter for surveillance of contraceptive pills     Emotional lability     TRAVIS (generalized anxiety disorder)       Wt Readings from Last 2 Encounters:   21 69.6 kg (153 lb 8 oz)   20 71.7 kg (158 lb)     BP Readings from Last 3 Encounters:   21 111/77   20 120/79   08/15/19 127/76         Current Outpatient Medications   Medication     citalopram (CELEXA) 20 MG tablet     hydrOXYzine (ATARAX) 25 MG tablet     norgestrel-ethinyl estradiol (LO/OVRAL) 0.3-30 MG-MCG tablet     Current Facility-Administered Medications   Medication     etonogestrel (IMPLANON/NEXPLANON) subdermal implant 68 mg     etonogestrel (IMPLANON/NEXPLANON) subdermal implant 68 mg       Social History     Tobacco Use     Smoking status: Former Smoker     Types: Cigarettes     Quit date: 2018     Years since quittin.6     Smokeless tobacco: Current User     Tobacco comment: Vapes    Substance Use Topics     Alcohol use: No     Drug use: No       Health Maintenance Due   Topic Date Due     DTAP/TDAP/TD IMMUNIZATION (1 - Tdap) 2005     HPV IMMUNIZATION (1 - 2-dose series) 2009     HEPATITIS C SCREENING  2016     PREVENTIVE CARE VISIT  08/15/2020     INFLUENZA VACCINE (1) 2020     PHQ-9  2020     CHLAMYDIA " SCREENING  02/17/2021       Lab Results   Component Value Date    PAP NIL 08/15/2019         January 20, 2021 9:09 AM

## 2021-01-20 NOTE — PROGRESS NOTES
Assessment & Plan     Abdominal pain, epigastric: Unclear etiology but considering mild gastritis, possibly IBS.  Persistent gut symptoms following known Covid infection that caused acute diarrhea.  Recommend a trial of omeprazole 20 mg once daily for the next 2 to 4 weeks.  Continue with fiber on a daily basis and consider adding a second dose.  Encouraged her to keep a food and symptom diary.  And she is to make a follow-up appointment in 4 weeks for recheck.  It is unclear how much her significant stress, anxiety and depression are playing a role.  - TSH with free T4 reflex  - CRP inflammation  - CBC with Diff Plt ([facility])  - Comprehensive metabolic panel    Altered bowel function  Nausea      Moderate episode of recurrent major depressive disorder (H)  - citalopram (CELEXA) 20 MG tablet; Take 1 tablet (20 mg) by mouth daily  - hydrOXYzine (ATARAX) 25 MG tablet; Take 1 tablet (25 mg) by mouth 3 times daily as needed for anxiety    TRAVIS (generalized anxiety disorder)  Encouraged and to restart her citalopram which had previously been helpful.  She stopped taking it because she ran out of medication.  Strongly encouraged counseling.  She is concerned about finances and I encouraged her to reach out to off behavioral health clinician and to the counseling service on her campus at [facility].    Encounter for surveillance of contraceptive pills  Refills given.  No side effects problems or concerns.  Will  - norgestrel-ethinyl estradiol (LO/OVRAL) 0.3-30 MG-MCG tablet; Take 1 tablet by mouth daily      35 minutes spent on the date of the encounter doing chart review, patient visit and documentation       Return in about 4 weeks (around 2/17/2021) for Follow up, with me, in person.    Iva Arnett PA-C  [facility]    Subjective     Taryn is a 20 year old who presents to clinic today for the following health issues    Abdominal/Flank Pain:  Digestive system has been off since she had covid DIAGNOSED BY PCR  in September. Her illness started with acute diarrhea and then lost her smell with no cough or other respiratory symptoms.  Currently she has intermittent nausea and decreased appetite.    Family history of IBS. No blood.  Stools are loose and frequent having 3 per day  But sometimes none in a day.  No constipation.  No current cramping or pain.  Denies bloating.  Certain foods are worse.  Fatty and rich foods.  Known lactose intolerance but seems worse than normal.    Has tried TUMS and she thinks it might have helped.  Also tried probiotics and citrucel.    She continues to have AM nausea and she does not have an appetite.  Often she does not eat until 3:00 in the afternoon.    Onset/Duration: 3-4 months  Description:   Character: gnawing feeling, decreased appetite and change in BM frequency    Intensity: mild  Progression of Symptoms:  waxing and waning  Accompanying Signs & Symptoms:  Fever/Chills: no  Gas/Bloating: no  Nausea: YES  Vomitting: no  Diarrhea: YES- not in the past several weeks  Constipation: no  Dysuria or Hematuria: no  History:   Trauma: no  Previous similar pain: no  Previous tests done: none  Precipitating factors:   Does the pain change with:     Food: no    Bowel Movement: no    Urination: no   Other factors:  no  Therapies tried and outcome: None  Patient's last menstrual period was 12/27/2020 (approximate).      Depression and Anxiety Follow-Up:  Very stressed and anxious.  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  She herself does not feel supported.     How are you doing with your depression since your last visit? No change    How are you doing with your anxiety since your last visit?  Worsened     Are you having other symptoms that might be associated with depression or anxiety? No    Have you had a significant life event? OTHER: COVID isolation, family stress     Do you have any concerns with your use of alcohol or other drugs? No    Social History  "    Tobacco Use     Smoking status: Former Smoker     Types: Cigarettes     Quit date: 2018     Years since quittin.6     Smokeless tobacco: Current User     Tobacco comment: Vapes    Substance Use Topics     Alcohol use: No     Drug use: No     PHQ 8/15/2019 2020 2021   PHQ-9 Total Score 11 9 9   Q9: Thoughts of better off dead/self-harm past 2 weeks Not at all Not at all Not at all   F/U: Thoughts of suicide or self-harm - - -   F/U: Safety concerns - - -     TRAVIS-7 SCORE 8/15/2019 2020 2021   Total Score 7 7 5       Contraception management: Denies hx of liver disease, migraine or thrombophlebitis   Denies fam hx of cancers or thrombophlebitis  Needs refills.      Review of Systems   Constitutional, HEENT, cardiovascular, pulmonary, gi and gu systems are negative, except as otherwise noted.      Objective    /77   Pulse 105   Temp 97  F (36.1  C)   Resp 14   Ht 1.753 m (5' 9.02\")   Wt 69.6 kg (153 lb 8 oz)   LMP 2020 (Approximate)   SpO2 99%   Breastfeeding No   BMI 22.66 kg/m    Body mass index is 22.66 kg/m .  Physical Exam   GENERAL: healthy, alert and no distress  NECK: no adenopathy, no asymmetry, masses, or scars and thyroid normal to palpation  RESP: lungs clear to auscultation - no rales, rhonchi or wheezes  CV: regular rate and rhythm, normal S1 S2, no S3 or S4, no murmur, click or rub, no peripheral edema and peripheral pulses strong  ABDOMEN: soft, nontender, no hepatosplenomegaly, no masses and bowel sounds normal  MS: no gross musculoskeletal defects noted, no edema  PSYCH: well dressed and groomed.  Good eye contact and is cooperative. Thoughts linear.  No delusions, compulsions or paranoia.  Affect anxious and stressed.  Patient denies homicidal and suicidal ideation as well as no thoughts or actions of self-harm.          "

## 2021-01-20 NOTE — PATIENT INSTRUCTIONS
Restart citalopram when you feel comfortable doing so. Start with 1/2 tab daily for 5-7 days.    Trial of OTC omeprazole 20 mg once daily for 4 weeks.    Keep a food any symptoms diary.    I strongly recommend eating on a regular basis even if it is small meals read out throughout    Avoid dairy if that is a specific trigger.    Labs today.    Consider there addition of fiber gummies.  Continue with the probiotics.    Consider a counselor. Self care is very important.  Check with your school because this is important!!!!

## 2021-01-21 ASSESSMENT — ANXIETY QUESTIONNAIRES: GAD7 TOTAL SCORE: 5

## 2021-02-17 ENCOUNTER — TELEPHONE (OUTPATIENT)
Dept: FAMILY MEDICINE | Facility: CLINIC | Age: 21
End: 2021-02-17

## 2021-02-17 NOTE — TELEPHONE ENCOUNTER
LVM for patient to call back and reschedule her F/u appointment with Rosa. When patient calls back please assist with scheduling.

## 2021-08-23 ENCOUNTER — MYC MEDICAL ADVICE (OUTPATIENT)
Dept: FAMILY MEDICINE | Facility: CLINIC | Age: 21
End: 2021-08-23

## 2021-08-25 ENCOUNTER — VIRTUAL VISIT (OUTPATIENT)
Dept: FAMILY MEDICINE | Facility: CLINIC | Age: 21
End: 2021-08-25
Payer: COMMERCIAL

## 2021-08-25 DIAGNOSIS — F41.1 GAD (GENERALIZED ANXIETY DISORDER): ICD-10-CM

## 2021-08-25 DIAGNOSIS — F33.1 MODERATE EPISODE OF RECURRENT MAJOR DEPRESSIVE DISORDER (H): ICD-10-CM

## 2021-08-25 DIAGNOSIS — F32.81 PMDD (PREMENSTRUAL DYSPHORIC DISORDER): Primary | ICD-10-CM

## 2021-08-25 RX ORDER — CITALOPRAM HYDROBROMIDE 20 MG/1
30 TABLET ORAL DAILY
Qty: 135 TABLET | Refills: 1 | Status: SHIPPED | OUTPATIENT
Start: 2021-08-25 | End: 2021-12-14

## 2021-08-25 ASSESSMENT — PATIENT HEALTH QUESTIONNAIRE - PHQ9
SUM OF ALL RESPONSES TO PHQ QUESTIONS 1-9: 11
SUM OF ALL RESPONSES TO PHQ QUESTIONS 1-9: 11
10. IF YOU CHECKED OFF ANY PROBLEMS, HOW DIFFICULT HAVE THESE PROBLEMS MADE IT FOR YOU TO DO YOUR WORK, TAKE CARE OF THINGS AT HOME, OR GET ALONG WITH OTHER PEOPLE: NOT DIFFICULT AT ALL

## 2021-08-25 ASSESSMENT — ANXIETY QUESTIONNAIRES
6. BECOMING EASILY ANNOYED OR IRRITABLE: SEVERAL DAYS
3. WORRYING TOO MUCH ABOUT DIFFERENT THINGS: NOT AT ALL
2. NOT BEING ABLE TO STOP OR CONTROL WORRYING: NOT AT ALL
4. TROUBLE RELAXING: NOT AT ALL
GAD7 TOTAL SCORE: 2
GAD7 TOTAL SCORE: 2
7. FEELING AFRAID AS IF SOMETHING AWFUL MIGHT HAPPEN: NOT AT ALL
8. IF YOU CHECKED OFF ANY PROBLEMS, HOW DIFFICULT HAVE THESE MADE IT FOR YOU TO DO YOUR WORK, TAKE CARE OF THINGS AT HOME, OR GET ALONG WITH OTHER PEOPLE?: NOT DIFFICULT AT ALL
1. FEELING NERVOUS, ANXIOUS, OR ON EDGE: SEVERAL DAYS
5. BEING SO RESTLESS THAT IT IS HARD TO SIT STILL: NOT AT ALL
GAD7 TOTAL SCORE: 2
7. FEELING AFRAID AS IF SOMETHING AWFUL MIGHT HAPPEN: NOT AT ALL

## 2021-08-25 NOTE — PATIENT INSTRUCTIONS
Please send me a [employer] message with how you are doing in the next 2 weeks.  Contact me sooner if any problems or concerns.  Increase citalopram to 30 mg daily    Make an appointment for physical in the next 2 months to get a refill of you medications.  Rosa Arnett PA-C    Patient Education     Managing PMS: Lifestyle Changes  Coping with PMS takes energy. But PMS symptoms can make you feel like you don t have the strength to cope. The key is to work helpful strategies into your daily life. Be active during the day and get enough sleep at night. Take time to relax. And don t be afraid to ask for support.  Being active    Activity raises the amount of oxygen in your body. This makes you feel better and gives you more energy. Exercise may also raise serotonin levels. For best results:  ? Try aerobic activities, such as walking, jogging, biking, swimming, or yoga.  ? Exercise for 30 minutes, most days of the week. If this seems like too much, start with 10 minutes a day and work your way up.  ? Find ways to fit activity into your day. Try taking the stairs instead of the elevator.    Sleeping well  When you re tired, PMS symptoms can be harder to cope with. Aim for at least 8 hours of sleep a night. To sleep better:    Follow a routine before bed. For instance, brush your teeth, read for half an hour, and turn out the lights at 10 p.m. every night.    Pull down window shades and keep pets out of the bedroom. If you re a light sleeper, try wearing earplugs and an eye mask to block out noise and light.  Taking time to relax  Being relaxed can give you the energy to deal with life s ups and downs. This makes even PMS symptoms easier to cope with. Learn to relax through simple techniques you can do anytime, anywhere. If you think you re too busy, start with just 5 minutes a day. Try:    Taking in a slow, deep breath through your nose. Hold it for 5 counts, then exhale through your mouth. Repeat this 3 times.    Picturing  yourself in a peaceful place, such as the countryside. Explore with your mind. Hear birds. Smell freshly cut grass. Enjoy a mental vacation.    Stretching to relax muscles and reduce aches. (If you have back problems, ask your healthcare provider about stretches that are safe for you.)  Finding support  You don t have to deal with PMS alone. To help you cope:    Talk with family and friends. Let them know how they can help when you re dealing with PMS symptoms.    Chat with female friends. Support each other. You may learn some new coping strategies.    Join a support group for women with PMS. Or try a stress management group. Ask your healthcare provider for resources.  [employer] last reviewed this educational content on 2/1/2020 2000-2021 [employer]. All rights reserved. This information is not intended as a substitute for professional medical care. Always follow your healthcare professional's instructions.

## 2021-08-25 NOTE — PROGRESS NOTES
Answers for HPI/ROS submitted by the patient on 8/25/2021  If you checked off any problems, how difficult have these problems made it for you to do your work, take care of things at home, or get along with other people?: Not difficult at all  PHQ9 TOTAL SCORE: 11  TRAVIS 7 TOTAL SCORE: 2    Sade is a 21 year old who is being evaluated via a billable video visit.      How would you like to obtain your AVS? MyChart  If the video visit is dropped, the invitation should be resent by: Send to e-mail at: [email or web address]  Will anyone else be joining your video visit? No      Video Start Time: 4:33 PM    Assessment & Plan     PMDD (premenstrual dysphoric disorder)  - citalopram (CELEXA) 20 MG tablet; Take 1.5 tablets (30 mg) by mouth daily    Moderate episode of recurrent major depressive disorder (H)  - citalopram (CELEXA) 20 MG tablet; Take 1.5 tablets (30 mg) by mouth daily    TRAVIS (generalized anxiety disorder)  - citalopram (CELEXA) 20 MG tablet; Take 1.5 tablets (30 mg) by mouth daily    Please send me a [employer] message with how you are doing in the next 2 weeks.  Contact me sooner if any problems or concerns.  Increase citalopram to 30 mg daily  Self care recommended see patient instructions    Make an appointment for physical in the next 2 months to get a refill of you medications.              Return in about 3 months (around 11/25/2021) for Routine preventive. .    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 OVerall doping well.  She is not participating in AA. She is not currently drinking. Feels she has good support. Is traveling to sisters wedding and was worried she ay run out of her medication.  Described worsening mood lability specifically related to her periods. Feels more emotional  and anxiopus during this time.  Has been worse over the past several months.    How are you doing with your depression since your last visit? Worsened only slightly    How are you doing with your anxiety since your last visit?  Improved     Are you having other symptoms that might be associated with depression or anxiety? No    Have you had a significant life event? OTHER: Covid cherri.  new job     Do you have any concerns with your use of alcohol or other drugs? Yes:  alcohol is going to be a concern--though currently not using    Social History     Tobacco Use     Smoking status: Former Smoker     Types: Cigarettes     Quit date: 6/1/2018     Years since quitting: 3.2     Smokeless tobacco: Current User     Tobacco comment: Vapes    Vaping Use     Vaping Use: Every day     Substances: Nicotine     Devices: Pre-filled or refillable cartridge, Refillable tank   Substance Use Topics     Alcohol use: No     Drug use: No     PHQ 6/25/2020 1/20/2021 8/25/2021   PHQ-9 Total Score 9 9 11   Q9: Thoughts of better off dead/self-harm past 2 weeks Not at all Not at all Not at all   F/U: Thoughts of suicide or self-harm - - -   F/U: Safety concerns - - -     TRAVIS-7 SCORE 6/25/2020 1/20/2021 8/25/2021   Total Score - - 2 (minimal anxiety)   Total Score 7 5 2       Review of Systems   Constitutional, HEENT, cardiovascular, pulmonary, gi and gu systems are negative, except as otherwise noted.      Objective    Vitals - Patient Reported  Weight (Patient Reported): 74.8 kg (165 lb)        Physical Exam   GENERAL: Healthy, alert and no distress  EYES: Eyes grossly normal to inspection.  No discharge or erythema, or obvious scleral/conjunctival abnormalities.  RESP: No audible wheeze, cough, or visible cyanosis.  No visible retractions or increased work of breathing.    SKIN: Visible skin clear. No significant rash, abnormal pigmentation or lesions.  NEURO: Cranial nerves grossly intact.  Mentation and speech appropriate for  age.  PSYCH: well dressed and groomed.  Good eye contact and is cooperative. Thoughts linear.  No delusions, compulsions or paranoia.  Affect bright.  Patient denies homicidal and suicidal ideation as well as no thoughts or actions of self-harm.            Video-Visit Details    Type of service:  Video Visit    Video End Time:4:43 PM    Originating Location (pt. Location): Home    Distant Location (provider location):  [facility]     Platform used for Video Visit: [employer]

## 2021-08-26 ASSESSMENT — ANXIETY QUESTIONNAIRES: GAD7 TOTAL SCORE: 2

## 2021-08-26 ASSESSMENT — PATIENT HEALTH QUESTIONNAIRE - PHQ9: SUM OF ALL RESPONSES TO PHQ QUESTIONS 1-9: 11

## 2021-10-18 ENCOUNTER — E-VISIT (OUTPATIENT)
Dept: FAMILY MEDICINE | Facility: CLINIC | Age: 21
End: 2021-10-18
Payer: COMMERCIAL

## 2021-10-18 DIAGNOSIS — B37.31 CANDIDAL VULVOVAGINITIS: Primary | ICD-10-CM

## 2021-10-18 PROCEDURE — 99421 OL DIG E/M SVC 5-10 MIN: CPT | Performed by: PHYSICIAN ASSISTANT

## 2021-10-18 RX ORDER — FLUCONAZOLE 150 MG/1
150 TABLET ORAL ONCE
Qty: 1 TABLET | Refills: 0 | Status: SHIPPED | OUTPATIENT
Start: 2021-10-18 | End: 2021-10-18

## 2021-12-14 ENCOUNTER — VIRTUAL VISIT (OUTPATIENT)
Dept: FAMILY MEDICINE | Facility: CLINIC | Age: 21
End: 2021-12-14
Payer: COMMERCIAL

## 2021-12-14 DIAGNOSIS — F41.1 GAD (GENERALIZED ANXIETY DISORDER): ICD-10-CM

## 2021-12-14 DIAGNOSIS — F33.1 MODERATE EPISODE OF RECURRENT MAJOR DEPRESSIVE DISORDER (H): Primary | ICD-10-CM

## 2021-12-14 DIAGNOSIS — F32.81 PMDD (PREMENSTRUAL DYSPHORIC DISORDER): ICD-10-CM

## 2021-12-14 PROCEDURE — 96127 BRIEF EMOTIONAL/BEHAV ASSMT: CPT | Performed by: INTERNAL MEDICINE

## 2021-12-14 PROCEDURE — 99214 OFFICE O/P EST MOD 30 MIN: CPT | Mod: 95 | Performed by: INTERNAL MEDICINE

## 2021-12-14 RX ORDER — HYDROXYZINE HYDROCHLORIDE 50 MG/1
25-50 TABLET, FILM COATED ORAL 3 TIMES DAILY PRN
Qty: 60 TABLET | Refills: 3 | Status: SHIPPED | OUTPATIENT
Start: 2021-12-14 | End: 2022-06-29

## 2021-12-14 RX ORDER — CITALOPRAM HYDROBROMIDE 20 MG/1
30 TABLET ORAL DAILY
Qty: 135 TABLET | Refills: 1 | Status: SHIPPED | OUTPATIENT
Start: 2021-12-14 | End: 2022-06-29

## 2021-12-14 ASSESSMENT — PATIENT HEALTH QUESTIONNAIRE - PHQ9
5. POOR APPETITE OR OVEREATING: SEVERAL DAYS
SUM OF ALL RESPONSES TO PHQ QUESTIONS 1-9: 3

## 2021-12-14 ASSESSMENT — ANXIETY QUESTIONNAIRES
1. FEELING NERVOUS, ANXIOUS, OR ON EDGE: SEVERAL DAYS
6. BECOMING EASILY ANNOYED OR IRRITABLE: SEVERAL DAYS
3. WORRYING TOO MUCH ABOUT DIFFERENT THINGS: SEVERAL DAYS
GAD7 TOTAL SCORE: 8
5. BEING SO RESTLESS THAT IT IS HARD TO SIT STILL: NOT AT ALL
IF YOU CHECKED OFF ANY PROBLEMS ON THIS QUESTIONNAIRE, HOW DIFFICULT HAVE THESE PROBLEMS MADE IT FOR YOU TO DO YOUR WORK, TAKE CARE OF THINGS AT HOME, OR GET ALONG WITH OTHER PEOPLE: VERY DIFFICULT
7. FEELING AFRAID AS IF SOMETHING AWFUL MIGHT HAPPEN: SEVERAL DAYS
2. NOT BEING ABLE TO STOP OR CONTROL WORRYING: NEARLY EVERY DAY

## 2021-12-14 NOTE — PROGRESS NOTES
Sade is a 21 year old who is being evaluated via a billable video visit.      How would you like to obtain your AVS? MyChart  If the video visit is dropped, the invitation should be resent by: Text to cell phone: [phone]  Will anyone else be joining your video visit? No    Video Start Time: 5:39 PM    Assessment & Plan     Moderate episode of recurrent major depressive disorder (H)  and  TRAVIS (generalized anxiety disorder)  and  PMDD (premenstrual dysphoric disorder)    Sade feels that depression is doing okay but anxiety has been worse.  She'd like to continue the current dose of citalopram and resume hydroxyzine.  The 25mg dose of the latter didn't see to help as much recently, so we'll try 50mg.  She has been struggling to find a therapist who is available, referral place for our system to see if there is someone she can get in with.  She stopped OCP since it didn't seem to be helping with PMDD and since cramps haven't been as bothersome for her recently.  Follow-up in 2 months as needed if not improving.     - citalopram (CELEXA) 20 MG tablet; Take 1.5 tablets (30 mg) by mouth daily  - hydrOXYzine (ATARAX) 50 MG tablet; Take 0.5-1 tablets (25-50 mg) by mouth 3 times daily as needed for anxiety  - Adult Mental Health Referral; Future      Return in about 2 months (around 2/14/2022) for Follow up.    Todd Chacko MD  [facility]    Subjective   Sade is a 21 year old who presents for the following health issues     Chief Complaint   Patient presents with     Depression     Anxiety        HPI     Depression and Anxiety Follow-Up    Her citalopram was increased to 30mg in August and this helped without any side effects.        How are you doing with your depression since your last visit? No change, though she notes she typically gets some worsened depression in the winter, but not too bad yet this year    How are you doing with your anxiety since your last visit?  Worsened due to some  family issues.  Notes increased irritability and trouble with not being able to stop worrying     Are you having other symptoms that might be associated with depression or anxiety? Yes:  panic attacks a couple weeks ago.  Has been out of her prn hydroxyzine, when she has the prescription, she will take 1-2 tablets in a day about twice per month.  The last time she took it, it didn't seem to make a difference.  She tolerates that well.      Have you had a significant life event? No     Do you have any concerns with your use of alcohol or other drugs? No     She has been trying to find a therapist and is having trouble finding someone is available    She would like to stop her OCP since she doesn't have concerns about getting pregnancy and she doesn't feel like this is helping as much any more- was prescribed this to help with cramps (more management now) and PMDD (didn't help).    Social History     Tobacco Use     Smoking status: Former Smoker     Types: Cigarettes     Quit date: 6/1/2018     Years since quitting: 3.5     Smokeless tobacco: Never Used     Tobacco comment: Vapes    Vaping Use     Vaping Use: Every day     Substances: Nicotine     Devices: Pre-filled or refillable cartridge, Refillable tank   Substance Use Topics     Alcohol use: No     Drug use: No     PHQ 1/20/2021 8/25/2021 12/14/2021   PHQ-9 Total Score 9 11 3   Q9: Thoughts of better off dead/self-harm past 2 weeks Not at all Not at all Not at all   F/U: Thoughts of suicide or self-harm - - -   F/U: Safety concerns - - -     TRAVIS-7 SCORE 1/20/2021 8/25/2021 12/14/2021   Total Score - 2 (minimal anxiety) -   Total Score 5 2 8     Last PHQ-9 12/14/2021   1.  Little interest or pleasure in doing things 1   2.  Feeling down, depressed, or hopeless 0   3.  Trouble falling or staying asleep, or sleeping too much 0   4.  Feeling tired or having little energy 1   5.  Poor appetite or overeating 0   6.  Feeling bad about yourself 1   7.  Trouble  concentrating 0   8.  Moving slowly or restless 0   Q9: Thoughts of better off dead/self-harm past 2 weeks 0   PHQ-9 Total Score 3   Difficulty at work, home, or with people Somewhat difficult   In the past two weeks have you had thoughts of suicide or self harm? -   Do you have concerns about your personal safety or the safety of others? -     TRAVIS-7  12/14/2021   1. Feeling nervous, anxious, or on edge 1   2. Not being able to stop or control worrying 3   3. Worrying too much about different things 1   4. Trouble relaxing 1   5. Being so restless that it is hard to sit still 0   6. Becoming easily annoyed or irritable 1   7. Feeling afraid, as if something awful might happen 1   TRAVIS-7 Total Score 8   If you checked any problems, how difficult have they made it for you to do your work, take care of things at home, or get along with other people? Very difficult       Suicide Assessment Five-step Evaluation and Treatment (SAFE-T)      Review of Systems   Constitutional, psych systems are negative, except as otherwise noted.      Objective           Vitals:  No vitals were obtained today due to virtual visit.    Physical Exam   GENERAL: Healthy, alert and no distress  EYES: Eyes grossly normal to inspection.  No discharge or erythema, or obvious scleral/conjunctival abnormalities.  RESP: No audible wheeze, cough, or visible cyanosis.  No visible retractions or increased work of breathing.    SKIN: Visible skin clear. No significant rash, abnormal pigmentation or lesions.  NEURO: Cranial nerves grossly intact.  Mentation and speech appropriate for age.  PSYCH: Mentation appears normal, affect normal/bright, judgement and insight intact, normal speech and appearance well-groomed.                Video-Visit Details    Type of service:  Video Visit    Video End Time:5:49 PM    Originating Location (pt. Location): Home    Distant Location (provider location):  [facility]     Platform used for Video  Visit: [employer]

## 2021-12-15 ASSESSMENT — ANXIETY QUESTIONNAIRES: GAD7 TOTAL SCORE: 8

## 2021-12-17 NOTE — PATIENT INSTRUCTIONS
[email or web address]    Psyllium Fiber (MetaMucil)   Received call from Cosme Ramirez at [facility] with [employer]. Brief description of triage: Patient Covid + on 11/24/21. Has had a low grade fever for about two weeks. Last night it was 102.3F. Patient also has a migraine headache. Hx of migraines and currently receiving tx for Cancer. Triage indicates for patient to Discuss with PCP and callback by Nurse within 1 hour. Writer spoke with Rehan Cardona at [facility] about patient's disposition. Rehan Cardona stated that there was not a nurse in the office today or patient's PCP. Rehan Cardona stated that patient could be seen today at [address] with one of the office providers. Patient transferred to Rehan Cardona to discuss. Care advice provided, patient verbalizes understanding; denies any other questions or concerns; instructed to call back for any new or worsening symptoms. Attention Provider: Thank you for allowing me to participate in the care of your patient. The patient was connected to triage in response to information provided to the ECC/PSC. Please do not respond through this encounter as the response is not directed to a shared pool. Reason for Disposition   [1] PERSISTING SYMPTOMS OF COVID-19 AND [2] NEW symptom AND [3] could be serious    Answer Assessment - Initial Assessment Questions  1. COVID-19 ONSET: \"When did the symptoms of COVID-19 first start?\"      11/23/21    2. DIAGNOSIS CONFIRMATION: \"How were you diagnosed? \" (e.g., COVID-19 oral or nasal viral test; COVID-19 antibody test; doctor visit)      [facility]- nasal swab    3. MAIN SYMPTOM:  Cristhian Damon is your main concern or symptom right now? \" (e.g., breathing difficulty, cough, fatigue. loss of smell)      Fever and severe headache    4. SYMPTOM ONSET: \"When did the fever  start? \"      A couple weeks. Patient states she is not sure. 5. BETTER-SAME-WORSE: \"Are you getting better, staying the same, or getting worse over the last 1 to 2 weeks? \"      States she just doesn't feel good.     6. RECENT MEDICAL VISIT: Narinder Lasso you been seen by a healthcare provider (doctor, NP, PA) for these persisting COVID-19 symptoms? \" If Yes, ask: \"When were you seen? \" (e.g., date)      \"I saw Dr. Flakito Cristina a few weeks ago and I was negative. \"    7. COUGH: \"Do you have a cough? \" If Yes, ask: \"How bad is the cough? \"        Off an on. Was given medication from PCP. 8. FEVER: \"Do you have a fever? \" If Yes, ask: \"What is your temperature, how was it measured, and when did it start? \"      Fever last night 102.3F. Has a cold pack on forehead now for migraine  . 9. BREATHING DIFFICULTY: Kevin Athens you having any trouble breathing? \" If Yes, ask: \"How bad is your breathing? \" (e.g., mild, moderate, severe)     - MILD: No SOB at rest, mild SOB with walking, speaks normally in sentences, can lay down, no retractions, pulse < 100.     - MODERATE: SOB at rest, SOB with minimal exertion and prefers to sit, cannot lie down flat, speaks in phrases, mild retractions, audible wheezing, pulse 100-120.     - SEVERE: Very SOB at rest, speaks in single words, struggling to breathe, sitting hunched forward, retractions, pulse > 120        Denies    10. HIGH RISK DISEASE: \"Do you have any chronic medical problems? \" (e.g., asthma, heart or lung disease, weak immune system, obesity, etc.)        Cancer, Multiple Myeloma    11. PREGNANCY: \"Is there any chance you are pregnant? \" \"When was your last menstrual period? \"        NA    12. OTHER SYMPTOMS: \"Do you have any other symptoms? \"  (e.g., fatigue, headache, muscle pain, weakness)        I'm weak from the Covid. I haven't gotten all my strength back.     Protocols used: COVID-19 - PERSISTING SYMPTOMS FOLLOW-UP CALL-ADULT-[place]

## 2022-03-17 ENCOUNTER — E-VISIT (OUTPATIENT)
Dept: URGENT CARE | Facility: CLINIC | Age: 22
End: 2022-03-17
Payer: COMMERCIAL

## 2022-03-17 ENCOUNTER — E-VISIT (OUTPATIENT)
Dept: URGENT CARE | Facility: CLINIC | Age: 22
End: 2022-03-17

## 2022-03-17 DIAGNOSIS — N76.0 ACUTE VAGINITIS: Primary | ICD-10-CM

## 2022-03-17 PROCEDURE — 99421 OL DIG E/M SVC 5-10 MIN: CPT | Performed by: PHYSICIAN ASSISTANT

## 2022-03-17 RX ORDER — MICONAZOLE NITRATE 2 %
1 CREAM WITH APPLICATOR VAGINAL AT BEDTIME
Qty: 45 G | Refills: 0 | Status: SHIPPED | OUTPATIENT
Start: 2022-03-17 | End: 2022-03-22

## 2022-03-17 NOTE — PATIENT INSTRUCTIONS
Preventing Vaginitis     Use mild, unscented soap when you bathe or shower to avoid irritating your vagina.    Vaginitis is irritation or infection of the vagina or the outside opening of it (vulva). Vaginitis can be caused by bacteria, viruses, parasites, or yeast. Chemicals such as in perfumes or soaps or in spermicides can sometimes be a cause. Vaginitis can be caused by hormone changes in pregnancy or with menopause. You can help prevent vaginitis. Follow the tips below. And see your healthcare provider if you have any symptoms.   Hygiene    Stay away from chemicals. Don't use vaginal sprays. Don't use scented toilet paper or tampons that are scented. Sprays and scents have chemicals that can irritate your vagina.    Don't douche unless you are told to by your healthcare provider. Douching is rarely needed. And it upsets the normal balance in the vagina.    Wash yourself well. Wash the outer vaginal area (vulva) every day with mild, unscented soap. Keep it as dry as possible.    Wipe correctly. Make sure to wipe from front to back after a bowel movement. This helps keep from spreading bacteria from your anus to your vagina.    Change your tampon often. During your period, make sure to change your tampon as often as directed on the package. This allows the normal flow of vaginal discharge and blood.    Lifestyle    Limit your number of sexual partners. The more partners you have, the greater your risk of infection. Using condoms helps reduce your risk.    Get enough sleep. Sleep helps keep your body s immune system healthy. This helps you fight infection.    Lose weight, if needed. Excess weight can reduce air circulation around your vagina. This can increase your risk of infection.    Exercise regularly. Regular activity helps keep your body healthy.    Take antibiotics only as directed. Antibiotics can change the normal chemical balance in the vagina.      Clothing    Don t sit in wet clothes. Yeast thrives  when it s warm and damp.    Don t wear tight pants. And don t wear tights, leggings, or hose without a cotton crotch. These types of clothing trap warmth and moisture.    Wear cotton underwear. Cotton lets air circulate around the vagina.    Symptoms of vaginitis    Irritation, swelling, or itching of the genital area    Vaginal discharge    Bad vaginal odor    Pain or burning during urination    [employer] last reviewed this educational content on 11/1/2019 2000-2021 [employer]. All rights reserved. This information is not intended as a substitute for professional medical care. Always follow your healthcare professional's instructions.

## 2022-03-17 NOTE — PATIENT INSTRUCTIONS
Thank you for choosing us for your care. I have placed an order for a prescription so that you can start treatment. View your full visit summary for details by clicking on the link below. Your pharmacist will able to address any questions you may have about the medication.     If you re not feeling better within 2-3 days, please schedule an appointment.  You can schedule an appointment right here in [facility], or call [phone]  If the visit is for the same symptoms as your eVisit, we ll refund the cost of your eVisit if seen within seven days.      Yeast Infection (Candida Vaginal Infection)    You have a Candida vaginal infection. This is also known as a yeast infection. It's most often caused by a type of yeast (fungus) called Candida. Candida are normally found in the vagina. But if they increase in number, this can lead to infection and cause symptoms.   Symptoms of a yeast infection can include:     Clumpy or thin, white discharge, which may look like cottage cheese    Itching or burning    Burning with urination  Certain factors can make a yeast infection more likely. These can include:     Taking certain medicines, such as antibiotics or birth control pills    Pregnancy    Diabetes    Weak immune system  A yeast infection is most often treated with antifungal medicine. This may be given as a vaginal cream or pills you take by mouth. Treatment may last for about 1 to 7 days. Women with severe or recurrent infections may need longer courses of treatment.   Home care    If you re prescribed medicine, be sure to use it as directed. Finish all of the medicine, even if your symptoms go away. Don t try to treat yourself using over-the-counter products without talking with your provider first. They will let you know if this is a good option for you.    Ask your provider what steps you can take to help reduce your risk of having a yeast infection in the future.    Follow-up care  Follow up with your healthcare  provider, or as directed.   When to seek medical advice  Call your healthcare provider right away if:     You have a fever of 100.4 F (38 C) or higher, or as directed by your provider.    Your symptoms worsen, or they don t go away within a few days of starting treatment.    You have new pain in the lower belly or pelvic region.    You have side effects that bother you or a reaction to the cream or pills you re prescribed.    You or any partners you have sex with have new symptoms, such as a rash, joint pain, or sores.  [employer] last reviewed this educational content on 7/1/2020 2000-2021 [employer]. All rights reserved. This information is not intended as a substitute for professional medical care. Always follow your healthcare professional's instructions.

## 2022-06-29 ENCOUNTER — VIRTUAL VISIT (OUTPATIENT)
Dept: FAMILY MEDICINE | Facility: CLINIC | Age: 22
End: 2022-06-29
Payer: COMMERCIAL

## 2022-06-29 DIAGNOSIS — F32.81 PMDD (PREMENSTRUAL DYSPHORIC DISORDER): ICD-10-CM

## 2022-06-29 DIAGNOSIS — F41.1 GAD (GENERALIZED ANXIETY DISORDER): ICD-10-CM

## 2022-06-29 DIAGNOSIS — F33.1 MODERATE EPISODE OF RECURRENT MAJOR DEPRESSIVE DISORDER (H): Primary | ICD-10-CM

## 2022-06-29 DIAGNOSIS — Z12.4 CERVICAL CANCER SCREENING: ICD-10-CM

## 2022-06-29 DIAGNOSIS — Z11.3 SCREENING FOR STDS (SEXUALLY TRANSMITTED DISEASES): ICD-10-CM

## 2022-06-29 DIAGNOSIS — Z30.011 ENCOUNTER FOR INITIAL PRESCRIPTION OF CONTRACEPTIVE PILLS: ICD-10-CM

## 2022-06-29 PROCEDURE — 99214 OFFICE O/P EST MOD 30 MIN: CPT | Mod: 95 | Performed by: FAMILY MEDICINE

## 2022-06-29 PROCEDURE — 96127 BRIEF EMOTIONAL/BEHAV ASSMT: CPT | Mod: 95 | Performed by: FAMILY MEDICINE

## 2022-06-29 RX ORDER — CITALOPRAM HYDROBROMIDE 20 MG/1
30 TABLET ORAL DAILY
Qty: 135 TABLET | Refills: 1 | Status: SHIPPED | OUTPATIENT
Start: 2022-06-29 | End: 2023-01-20

## 2022-06-29 RX ORDER — NORGESTIMATE AND ETHINYL ESTRADIOL 0.25-0.035
1 KIT ORAL DAILY
Qty: 112 TABLET | Refills: 3 | Status: SHIPPED | OUTPATIENT
Start: 2022-06-29 | End: 2023-01-20

## 2022-06-29 RX ORDER — HYDROXYZINE HYDROCHLORIDE 50 MG/1
25-50 TABLET, FILM COATED ORAL 3 TIMES DAILY PRN
Qty: 60 TABLET | Refills: 3 | Status: SHIPPED | OUTPATIENT
Start: 2022-06-29 | End: 2023-01-20

## 2022-06-29 ASSESSMENT — PATIENT HEALTH QUESTIONNAIRE - PHQ9
SUM OF ALL RESPONSES TO PHQ QUESTIONS 1-9: 4
SUM OF ALL RESPONSES TO PHQ QUESTIONS 1-9: 4
10. IF YOU CHECKED OFF ANY PROBLEMS, HOW DIFFICULT HAVE THESE PROBLEMS MADE IT FOR YOU TO DO YOUR WORK, TAKE CARE OF THINGS AT HOME, OR GET ALONG WITH OTHER PEOPLE: SOMEWHAT DIFFICULT

## 2022-06-29 NOTE — PROGRESS NOTES
Answers for HPI/ROS submitted by the patient on 6/29/2022  If you checked off any problems, how difficult have these problems made it for you to do your work, take care of things at home, or get along with other people?: Somewhat difficult  PHQ9 TOTAL SCORE: 4  What is the reason for your visit today? : Medication refill and birth control  How many servings of fruits and vegetables do you eat daily?: 2-3  On average, how many sweetened beverages do you drink each day (Examples: soda, juice, sweet tea, etc.  Do NOT count diet or artificially sweetened beverages)?: 0  How many minutes a day do you exercise enough to make your heart beat faster?: 30 to 60  How many days a week do you exercise enough to make your heart beat faster?: 4  How many days per week do you miss taking your medication?: 0    Sade is a 22 year old who is being evaluated via a billable video visit.      How would you like to obtain your AVS? MyChart  If the video visit is dropped, the invitation should be resent by: Text to cell phone: see epic  Will anyone else be joining your video visit? No          Assessment & Plan     Moderate episode of recurrent major depressive disorder (H)  TRAVIS (generalized anxiety disorder)  Overall doing well right now.  Is tolerating the 30 mg dose of citalopram without issue.  We discussed if wintertime symptoms flare we could always increase the dose at that point  - citalopram (CELEXA) 20 MG tablet; Take 1.5 tablets (30 mg) by mouth daily  - hydrOXYzine (ATARAX) 50 MG tablet; Take 0.5-1 tablets (25-50 mg) by mouth 3 times daily as needed for anxiety      PMDD (premenstrual dysphoric disorder)  Overall seems to be well managed right now especially off OCP.  We will need to monitor as she returns to OCP to see if the symptoms intensify  - citalopram (CELEXA) 20 MG tablet; Take 1.5 tablets (30 mg) by mouth daily    Encounter for initial prescription of contraceptive pills  On her last OCP she did have some impact on  mood especially premenstrually.  We will trial increasing estrogen dosing slightly and changing pill to Ortho-Cyclen.  Discussed 3-month trial period But certainly update me if any significant side effects or changing medication.  Reviewed backup birth control for first months she is back on the pill and discussed condoms for STI protection.  - norgestimate-ethinyl estradiol (ORTHO-CYCLEN) 0.25-35 MG-MCG tablet; Take 1 tablet by mouth daily Skip placebo tablets x 3 packs and then take the 4th pack completely    Screening for STDs (sexually transmitted diseases)  Cervical cancer screening  Reviewed she is due for Pap and STI screening.  She will schedule physical for later this summer.              Return in about 2 months (around 8/29/2022) for Routine preventive, pap, STI testing.    Daxa Linton MD  [facility]    Kobe Stuart is a 22 year old, presenting for the following health issues:  Contraception (Getting back on old birth control) and Depression ( Refill on antidepressants)      Contraception    History of Present Illness       Reason for visit:  Medication refill and birth control    She eats 2-3 servings of fruits and vegetables daily.She consumes 0 sweetened beverage(s) daily.She exercises with enough effort to increase her heart rate 30 to 60 minutes per day.  She exercises with enough effort to increase her heart rate 4 days per week.   She is taking medications regularly.    Today's PHQ-9         PHQ-9 Total Score: 4    PHQ-9 Q9 Thoughts of better off dead/self-harm past 2 weeks :   Not at all    How difficult have these problems made it for you to do your work, take care of things at home, or get along with other people: Somewhat difficult       Like how she feels off of birth control  Stopped OCP in Sept as wondered if it was worsening her depression.   Feels depression was linked to her cycles, especially that week prior to cycle. Did improve somewhat but  also increased her citalopram dose at that time too.   Cycles every 3 months  No current vaginal concerns.       Happy with citalopram dosing  Winter tends to be time that things flare more for her.   Does take vit D in the winter.     PHQ-9 SCORE 8/25/2021 12/14/2021 6/29/2022   PHQ-9 Total Score MyChart 11 (Moderate depression) - 4 (Minimal depression)   PHQ-9 Total Score 11 3 4     TRAVIS-7 SCORE 1/20/2021 8/25/2021 12/14/2021   Total Score - 2 (minimal anxiety) -   Total Score 5 2 8                 Objective           Vitals:  No vitals were obtained today due to virtual visit.    Physical Exam   GENERAL: Healthy, alert and no distress  EYES: Eyes grossly normal to inspection.  No discharge or erythema, or obvious scleral/conjunctival abnormalities.  RESP: No audible wheeze, cough, or visible cyanosis.  No visible retractions or increased work of breathing.    SKIN: Visible skin clear. No significant rash, abnormal pigmentation or lesions.  NEURO: Cranial nerves grossly intact.  Mentation and speech appropriate for age.  PSYCH: Mentation appears normal, affect normal/bright, judgement and insight intact, normal speech and appearance well-groomed.              Video-Visit Details    Video Start Time: 5:53    Type of service:  Video Visit    Video End Time:6:11 PM    Originating Location (pt. Location): Home    Distant Location (provider location):  [facility]     Platform used for Video Visit: [employer]    .  ..

## 2023-01-20 ENCOUNTER — VIRTUAL VISIT (OUTPATIENT)
Dept: FAMILY MEDICINE | Facility: CLINIC | Age: 23
End: 2023-01-20
Payer: COMMERCIAL

## 2023-01-20 DIAGNOSIS — F41.1 GAD (GENERALIZED ANXIETY DISORDER): ICD-10-CM

## 2023-01-20 DIAGNOSIS — F32.81 PMDD (PREMENSTRUAL DYSPHORIC DISORDER): ICD-10-CM

## 2023-01-20 DIAGNOSIS — F33.1 MODERATE EPISODE OF RECURRENT MAJOR DEPRESSIVE DISORDER (H): ICD-10-CM

## 2023-01-20 PROCEDURE — 99214 OFFICE O/P EST MOD 30 MIN: CPT | Mod: 95 | Performed by: STUDENT IN AN ORGANIZED HEALTH CARE EDUCATION/TRAINING PROGRAM

## 2023-01-20 RX ORDER — HYDROXYZINE HYDROCHLORIDE 50 MG/1
25-50 TABLET, FILM COATED ORAL 3 TIMES DAILY PRN
Qty: 90 TABLET | Refills: 3 | Status: SHIPPED | OUTPATIENT
Start: 2023-01-20 | End: 2023-04-17

## 2023-01-20 RX ORDER — CITALOPRAM HYDROBROMIDE 20 MG/1
30 TABLET ORAL DAILY
Qty: 135 TABLET | Refills: 2 | Status: SHIPPED | OUTPATIENT
Start: 2023-01-20 | End: 2023-11-08

## 2023-01-20 ASSESSMENT — PATIENT HEALTH QUESTIONNAIRE - PHQ9
10. IF YOU CHECKED OFF ANY PROBLEMS, HOW DIFFICULT HAVE THESE PROBLEMS MADE IT FOR YOU TO DO YOUR WORK, TAKE CARE OF THINGS AT HOME, OR GET ALONG WITH OTHER PEOPLE: VERY DIFFICULT
SUM OF ALL RESPONSES TO PHQ QUESTIONS 1-9: 6
SUM OF ALL RESPONSES TO PHQ QUESTIONS 1-9: 6

## 2023-01-20 NOTE — PROGRESS NOTES
Sade is a 22 year old who is being evaluated via a billable video visit.      How would you like to obtain your AVS? MyChart  If the video visit is dropped, the invitation should be resent by: Text to cell phone: [phone]  Will anyone else be joining your video visit? No          Assessment & Plan     (F32.81) PMDD (premenstrual dysphoric disorder)  Comment: Stable. Will send refills   Plan: citalopram (CELEXA) 20 MG tablet        Pending pickup of medications from pharmacy      (F33.1) Moderate episode of recurrent major depressive disorder (H)  Comment: Stable. No need for changes in doses at this time. Will send refills  Plan: citalopram (CELEXA) 20 MG tablet, hydrOXYzine         (ATARAX) 50 MG tablet        Pending pickup of medications from pharmacy      (F41.1) TRAVIS (generalized anxiety disorder)  Comment: Stable.   Plan: citalopram (CELEXA) 20 MG tablet        Pending pickup of medications from pharmacy                     No follow-ups on file.    ROJAS MONK MD  [facility]   Sade is a 22 year old, presenting for the following health issues:  Recheck Medication      History of Present Illness       Reason for visit:  Medication refill - other docotor was a long wait    She eats 2-3 servings of fruits and vegetables daily.She consumes 0 sweetened beverage(s) daily.She exercises with enough effort to increase her heart rate 20 to 29 minutes per day.  She exercises with enough effort to increase her heart rate 3 or less days per week.   She is taking medications regularly.    Today's PHQ-9         PHQ-9 Total Score: 6    PHQ-9 Q9 Thoughts of better off dead/self-harm past 2 weeks :   Not at all    How difficult have these problems made it for you to do your work, take care of things at home, or get along with other people: Very difficult       Depression and Anxiety Follow-Up    How are you doing with your depression since your last visit? Stable.    How are you  doing with your anxiety since your last visit?  Stable.    Are you having other symptoms that might be associated with depression or anxiety? No    Have you had a significant life event? No     Do you have any concerns with your use of alcohol or other drugs? No    Social History     Tobacco Use     Smoking status: Former     Types: Cigarettes     Quit date: 2018     Years since quittin.6     Smokeless tobacco: Never     Tobacco comments:     Vapes    Vaping Use     Vaping Use: Every day     Substances: Nicotine     Devices: Pre-filled or refillable cartridge, Refillable tank   Substance Use Topics     Alcohol use: No     Drug use: No     PHQ 2021   PHQ-9 Total Score 3 4 6   Q9: Thoughts of better off dead/self-harm past 2 weeks Not at all Not at all Not at all   F/U: Thoughts of suicide or self-harm - - -   F/U: Safety concerns - - -     TRAVIS-7 SCORE 2021   Total Score - 2 (minimal anxiety) -   Total Score 5 2 8     Last PHQ-9 2023   1.  Little interest or pleasure in doing things 1   2.  Feeling down, depressed, or hopeless 1   3.  Trouble falling or staying asleep, or sleeping too much 1   4.  Feeling tired or having little energy 1   5.  Poor appetite or overeating 2   6.  Feeling bad about yourself 0   7.  Trouble concentrating 0   8.  Moving slowly or restless 0   Q9: Thoughts of better off dead/self-harm past 2 weeks 0   PHQ-9 Total Score 6   Difficulty at work, home, or with people -   In the past two weeks have you had thoughts of suicide or self harm? -   Do you have concerns about your personal safety or the safety of others? -     TRAVIS-7  2021   1. Feeling nervous, anxious, or on edge 1   2. Not being able to stop or control worrying 3   3. Worrying too much about different things 1   4. Trouble relaxing 1   5. Being so restless that it is hard to sit still 0   6. Becoming easily annoyed or irritable 1   7. Feeling afraid, as if  something awful might happen 1   TRAVIS-7 Total Score 8   If you checked any problems, how difficult have they made it for you to do your work, take care of things at home, or get along with other people? Very difficult       Suicide Assessment Five-step Evaluation and Treatment (SAFE-T)        Review of Systems   Constitutional, HEENT, cardiovascular, pulmonary, gi and gu systems are negative, except as otherwise noted.      Objective           Vitals:  No vitals were obtained today due to virtual visit.    Physical Exam   GENERAL: Healthy, alert and no distress  EYES: Eyes grossly normal to inspection.  No discharge or erythema, or obvious scleral/conjunctival abnormalities.  RESP: No audible wheeze, cough, or visible cyanosis.  No visible retractions or increased work of breathing.    SKIN: Visible skin clear. No significant rash, abnormal pigmentation or lesions.  NEURO: Cranial nerves grossly intact.  Mentation and speech appropriate for age.  PSYCH: Mentation appears normal, affect normal/bright, judgement and insight intact, normal speech and appearance well-groomed.    No results found for any visits on 01/20/23.            Video-Visit Details    Type of service:  Video Visit     Originating Location (pt. Location): Home    Distant Location (provider location):  On-site  Platform used for Video Visit: [employer]

## 2023-04-17 ENCOUNTER — VIRTUAL VISIT (OUTPATIENT)
Dept: FAMILY MEDICINE | Facility: CLINIC | Age: 23
End: 2023-04-17
Payer: COMMERCIAL

## 2023-04-17 DIAGNOSIS — F33.1 MODERATE EPISODE OF RECURRENT MAJOR DEPRESSIVE DISORDER (H): ICD-10-CM

## 2023-04-17 DIAGNOSIS — F32.81 PMDD (PREMENSTRUAL DYSPHORIC DISORDER): Primary | ICD-10-CM

## 2023-04-17 PROCEDURE — 99214 OFFICE O/P EST MOD 30 MIN: CPT | Mod: VID | Performed by: NURSE PRACTITIONER

## 2023-04-17 PROCEDURE — 96127 BRIEF EMOTIONAL/BEHAV ASSMT: CPT | Mod: 95 | Performed by: NURSE PRACTITIONER

## 2023-04-17 RX ORDER — HYDROXYZINE HYDROCHLORIDE 50 MG/1
25-50 TABLET, FILM COATED ORAL 3 TIMES DAILY PRN
Qty: 90 TABLET | Refills: 3 | Status: SHIPPED | OUTPATIENT
Start: 2023-04-17 | End: 2024-05-02

## 2023-04-17 ASSESSMENT — ANXIETY QUESTIONNAIRES
2. NOT BEING ABLE TO STOP OR CONTROL WORRYING: SEVERAL DAYS
5. BEING SO RESTLESS THAT IT IS HARD TO SIT STILL: MORE THAN HALF THE DAYS
GAD7 TOTAL SCORE: 10
GAD7 TOTAL SCORE: 10
1. FEELING NERVOUS, ANXIOUS, OR ON EDGE: SEVERAL DAYS
6. BECOMING EASILY ANNOYED OR IRRITABLE: NEARLY EVERY DAY
3. WORRYING TOO MUCH ABOUT DIFFERENT THINGS: SEVERAL DAYS
7. FEELING AFRAID AS IF SOMETHING AWFUL MIGHT HAPPEN: SEVERAL DAYS
IF YOU CHECKED OFF ANY PROBLEMS ON THIS QUESTIONNAIRE, HOW DIFFICULT HAVE THESE PROBLEMS MADE IT FOR YOU TO DO YOUR WORK, TAKE CARE OF THINGS AT HOME, OR GET ALONG WITH OTHER PEOPLE: VERY DIFFICULT

## 2023-04-17 ASSESSMENT — PATIENT HEALTH QUESTIONNAIRE - PHQ9
10. IF YOU CHECKED OFF ANY PROBLEMS, HOW DIFFICULT HAVE THESE PROBLEMS MADE IT FOR YOU TO DO YOUR WORK, TAKE CARE OF THINGS AT HOME, OR GET ALONG WITH OTHER PEOPLE: VERY DIFFICULT
SUM OF ALL RESPONSES TO PHQ QUESTIONS 1-9: 13
5. POOR APPETITE OR OVEREATING: SEVERAL DAYS
SUM OF ALL RESPONSES TO PHQ QUESTIONS 1-9: 13

## 2023-04-17 ASSESSMENT — ENCOUNTER SYMPTOMS: NERVOUS/ANXIOUS: 1

## 2023-04-17 NOTE — PROGRESS NOTES
Sade is a 22 year old who is being evaluated via a billable video visit.      How would you like to obtain your AVS? MyChart  If the video visit is dropped, the invitation should be resent by: Text to cell phone: [phone]  Will anyone else be joining your video visit? No          Assessment & Plan       ICD-10-CM    1. PMDD (premenstrual dysphoric disorder)  F32.81 Adult Mental Health  Referral      2. Moderate episode of recurrent major depressive disorder (H)  F33.1 hydrOXYzine (ATARAX) 50 MG tablet        Significant pmdd symptoms with history of mdd. Recommend trial of intermittent dosing of citalopram.  Increased dose of citalopram to 40 mg during her luteal phase and reduce back down to 30 mg or 20 mg after her menstrual cycle starts.  Recommend following up with her primary care in the next 1 month to reevaluate symptoms.  I additionally placed a referral to psychiatry for medication management/stabilization.  She may require mood stabilizers for her severe symptoms.  Refilled hydroxyzine for patient to help with anxiety.  Safety plan discussed with patient.  Advised patient to monitor her overall symptoms and to present to the emergency department if she feels that she is escalating.  She is in agreement this plan.         Depression Screening Follow Up        4/17/2023     5:00 PM   PHQ   PHQ-9 Total Score 13   Q9: Thoughts of better off dead/self-harm past 2 weeks Several days   F/U: Thoughts of suicide or self-harm Yes   F/U: Self harm-plan No   F/U: Self-harm action No   F/U: Safety concerns No      [facility]AYLE[facility]   Sade is a 22 year old, presenting for the following health issues:  Anxiety, Depression, and Health Maintenance (Advised patient of .)        4/17/2023     9:43 AM   Additional Questions   Roomed by Maria T Rodney CMA   Accompanied by self     Anxiety    History of Present Illness       She eats 2-3 servings  "of fruits and vegetables daily.She consumes 0 sweetened beverage(s) daily.She exercises with enough effort to increase her heart rate 30 to 60 minutes per day.  She exercises with enough effort to increase her heart rate 5 days per week.   She is taking medications regularly.    Today's PHQ-9         PHQ-9 Total Score: 13    PHQ-9 Q9 Thoughts of better off dead/self-harm past 2 weeks :   Several days  Thoughts of suicide or self harm: (P) Yes  Self-harm Plan:   (P) No  Self-harm Action:     (P) No  Safety concerns for self or others: (P) No    How difficult have these problems made it for you to do your work, take care of things at home, or get along with other people: Very difficult       Depression and Anxiety Follow-    How are you doing with your depression since your last visit? Worsened     How are you doing with your anxiety since your last visit?  Worsened     Are you having other symptoms that might be associated with depression or anxiety? Yes:  tight chest, headache, poor appetite, light swithch makes her a different person- can't control what she says; becomes enraged, cries- anxieety is making her a different person    Have you had a significant life event? OTHER: is moving, but is exited to move, maybe a little bit of anxiety from it     Do you have any concerns with your use of alcohol or other drugs? No    She has been paying attention to her mental health and how it associates with her mensis. She has a \"flip\" that switches 8-9 days prior to her mensis. She is a mental health , she does the coping skills without significant improvement. OCP switched and she tried made changes. The last 6 months has been the biggest problem.     She is feeling suicidal, self harm, and homicidal right before her mensis. It is becoming scary for her. Mensis arrives and mood improves again. She is on 30mg of citalopram. She has been on this medication for about 2 years. She has not tried intermittent " increased dosing during her luteal phase.     She has been on zoloft in the past, did not seem like it made a difference.     In general her mensis is normal and regular.   She would like a referral to psychiatry.      Social History     Tobacco Use     Smoking status: Former     Types: Cigarettes     Quit date: 2018     Years since quittin.8     Smokeless tobacco: Never     Tobacco comments:     Vapes    Vaping Use     Vaping status: Former     Substances: Nicotine     Devices: Pre-filled or refillable cartridge, Refillable tank     Quit date: 2023   Substance Use Topics     Alcohol use: No     Drug use: No         2022     5:05 PM 2023     3:49 PM 2023     5:00 PM   PHQ   PHQ-9 Total Score 4 6 13   Q9: Thoughts of better off dead/self-harm past 2 weeks Not at all Not at all Several days   F/U: Thoughts of suicide or self-harm   Yes   F/U: Self harm-plan   No   F/U: Self-harm action   No   F/U: Safety concerns   No         2021     4:22 PM 2021     5:26 PM 2023     5:01 PM   TRAVIS-7 SCORE   Total Score 2 (minimal anxiety)     Total Score 2 8 10         2023     5:00 PM   Last PHQ-9   1.  Little interest or pleasure in doing things 2   2.  Feeling down, depressed, or hopeless 1   3.  Trouble falling or staying asleep, or sleeping too much 1   4.  Feeling tired or having little energy 1   5.  Poor appetite or overeating 3   6.  Feeling bad about yourself 2   7.  Trouble concentrating 1   8.  Moving slowly or restless 1   Q9: Thoughts of better off dead/self-harm past 2 weeks 1   PHQ-9 Total Score 13   In the past two weeks have you had thoughts of suicide or self harm? Yes   Do you have concerns about your personal safety or the safety of others? No   In the past 2 weeks have you thought about a plan or had intention to harm yourself? No   In the past 2 weeks have you acted on these thoughts in any way? No         2023     5:01 PM   TRAVIS-7    1. Feeling nervous,  anxious, or on edge 1   2. Not being able to stop or control worrying 1   3. Worrying too much about different things 1   4. Trouble relaxing 1   5. Being so restless that it is hard to sit still 2   6. Becoming easily annoyed or irritable 3   7. Feeling afraid, as if something awful might happen 1   TRAVIS-7 Total Score 10   If you checked any problems, how difficult have they made it for you to do your work, take care of things at home, or get along with other people? Very difficult     Follow Up Actions Taken  Crisis resource information provided in the After Visit Summary  Mental Health Referral placed     Discussed the following ways the patient can remain in a safe environment:  be around others  Suicide Assessment Five-step Evaluation and Treatment (SAFE-T)      Review of Systems   Psychiatric/Behavioral: The patient is nervous/anxious.       Constitutional, HEENT, cardiovascular, pulmonary, gi and gu systems are negative, except as otherwise noted.      Objective           Vitals:  No vitals were obtained today due to virtual visit.    Physical Exam   GENERAL: Healthy, alert and no distress  EYES: Eyes grossly normal to inspection.  No discharge or erythema, or obvious scleral/conjunctival abnormalities.  RESP: No audible wheeze, cough, or visible cyanosis.  No visible retractions or increased work of breathing.    SKIN: Visible skin clear. No significant rash, abnormal pigmentation or lesions.  NEURO: Cranial nerves grossly intact.  Mentation and speech appropriate for age.  PSYCH: Mentation appears normal, affect normal/bright, judgement and insight intact, normal speech and appearance well-groomed.            Video-Visit Details    Type of service:  Video Visit     Originating Location (pt. Location): Home    Distant Location (provider location):  On-site  Platform used for Video Visit: [employer]

## 2023-11-08 ENCOUNTER — VIRTUAL VISIT (OUTPATIENT)
Dept: INTERNAL MEDICINE | Facility: CLINIC | Age: 23
End: 2023-11-08
Payer: COMMERCIAL

## 2023-11-08 DIAGNOSIS — F32.81 PMDD (PREMENSTRUAL DYSPHORIC DISORDER): ICD-10-CM

## 2023-11-08 DIAGNOSIS — F33.1 MODERATE EPISODE OF RECURRENT MAJOR DEPRESSIVE DISORDER (H): ICD-10-CM

## 2023-11-08 DIAGNOSIS — F41.1 GAD (GENERALIZED ANXIETY DISORDER): ICD-10-CM

## 2023-11-08 PROCEDURE — 99213 OFFICE O/P EST LOW 20 MIN: CPT | Mod: VID | Performed by: INTERNAL MEDICINE

## 2023-11-08 RX ORDER — CITALOPRAM HYDROBROMIDE 20 MG/1
40 TABLET ORAL DAILY
Qty: 180 TABLET | Refills: 0 | Status: SHIPPED | OUTPATIENT
Start: 2023-11-08 | End: 2024-02-05

## 2023-11-08 ASSESSMENT — ANXIETY QUESTIONNAIRES
4. TROUBLE RELAXING: MORE THAN HALF THE DAYS
GAD7 TOTAL SCORE: 16
GAD7 TOTAL SCORE: 16
3. WORRYING TOO MUCH ABOUT DIFFERENT THINGS: NEARLY EVERY DAY
5. BEING SO RESTLESS THAT IT IS HARD TO SIT STILL: MORE THAN HALF THE DAYS
1. FEELING NERVOUS, ANXIOUS, OR ON EDGE: MORE THAN HALF THE DAYS
6. BECOMING EASILY ANNOYED OR IRRITABLE: MORE THAN HALF THE DAYS
2. NOT BEING ABLE TO STOP OR CONTROL WORRYING: NEARLY EVERY DAY
7. FEELING AFRAID AS IF SOMETHING AWFUL MIGHT HAPPEN: MORE THAN HALF THE DAYS
IF YOU CHECKED OFF ANY PROBLEMS ON THIS QUESTIONNAIRE, HOW DIFFICULT HAVE THESE PROBLEMS MADE IT FOR YOU TO DO YOUR WORK, TAKE CARE OF THINGS AT HOME, OR GET ALONG WITH OTHER PEOPLE: VERY DIFFICULT

## 2023-11-08 ASSESSMENT — PATIENT HEALTH QUESTIONNAIRE - PHQ9
10. IF YOU CHECKED OFF ANY PROBLEMS, HOW DIFFICULT HAVE THESE PROBLEMS MADE IT FOR YOU TO DO YOUR WORK, TAKE CARE OF THINGS AT HOME, OR GET ALONG WITH OTHER PEOPLE: VERY DIFFICULT
SUM OF ALL RESPONSES TO PHQ QUESTIONS 1-9: 18
SUM OF ALL RESPONSES TO PHQ QUESTIONS 1-9: 18

## 2023-11-08 NOTE — PROGRESS NOTES
Sade is a 23 year old who is being evaluated via a billable video visit.      How would you like to obtain your AVS? MyChart  If the video visit is dropped, the invitation should be resent by: Text to cell phone: [phone]  Will anyone else be joining your video visit? No      Assessment & Plan   Problem List Items Addressed This Visit       Moderate episode of recurrent major depressive disorder (H)    Relevant Medications    citalopram (CELEXA) 20 MG tablet    TRAVIS (generalized anxiety disorder)    Relevant Medications    citalopram (CELEXA) 20 MG tablet     Other Visit Diagnoses       PMDD (premenstrual dysphoric disorder)        Relevant Medications    citalopram (CELEXA) 20 MG tablet             Doing worse with season change, boyfriend is gone in  so she is alone. Lost her job.     Working on a new job.    Will in crease medication from 30 to 40 mg. She is a ware of heart rhythm risks with higher dose.     Continue with therapist.      Should establish with a primary provider to keep care constant.           Depression Screening Follow Up        11/8/2023     2:42 PM   PHQ   PHQ-9 Total Score 18   Q9: Thoughts of better off dead/self-harm past 2 weeks Several days   F/U: Thoughts of suicide or self-harm Yes   F/U: Self harm-plan No   F/U: Self-harm action No   F/U: Safety concerns No               Follow Up      Follow Up Actions Taken  Patient to follow up with PCP.  Clinic staff to schedule appointment if able.    Discussed the following ways the patient can remain in a safe environment:  needs to continue family support   MEDICATIONS:        - Increase citalopram  to 40mg       - Continue other medications without change  Continue with her therapist    Rodney Maya MD  [facility]   Sade is a 23 year old, presenting for the following health issues:  Refill Request (Celexa)  - Increased Fatigued and depression. SO just got pulled to the  for 5  months and has no family around here.      11/8/2023     2:35 PM   Additional Questions   Roomed by Pierce PERLA   Accompanied by Self         11/8/2023     2:35 PM   Patient Reported Additional Medications   Patient reports taking the following new medications None       History of Present Illness       Mental Health Follow-up:  Patient presents to follow-up on Depression & Anxiety.Patient's depression since last visit has been:  Bad  The patient is having other symptoms associated with depression.  Patient's anxiety since last visit has been:  Bad  The patient is having other symptoms associated with anxiety.  Any significant life events: relationship concerns  Patient is feeling anxious or having panic attacks.  Patient has no concerns about alcohol or drug use.    She eats 0-1 servings of fruits and vegetables daily.She consumes 0 sweetened beverage(s) daily.She exercises with enough effort to increase her heart rate 30 to 60 minutes per day.  She exercises with enough effort to increase her heart rate 3 or less days per week.   She is taking medications regularly.       Medication Followup of Citalopram  Taking Medication as prescribed: yes  Side Effects:  None  Medication Helping Symptoms:  yes - would like to up for the winter for seasonal depression    She is at home, not feeling well, sick for a week. Starting to feel better.  Lives with her boyfriend, he is gone with [employer] for 5 months training.  She is alone with her animals.     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     Been on citalopram since 2019, mellows her out and helps her the best.  No side effects now.  Currently taking 30 mg daily.    Depression worse when having her period, takes an extra half pill up to 40mg during period.  Now SAD wants to go to 40mg a day.     She has a therapist through [facility]. Been good.     PHQ 9 score of 18, passive thoughts of harm. No plan. Has support with family.       Review of Systems         Objective           Vitals:  No vitals were obtained today due to virtual visit.    Physical Exam   GENERAL: Healthy, alert and no distress  EYES: Eyes grossly normal to inspection.  No discharge or erythema, or obvious scleral/conjunctival abnormalities.  RESP: No audible wheeze, cough, or visible cyanosis.  No visible retractions or increased work of breathing.    SKIN: Visible skin clear. No significant rash, abnormal pigmentation or lesions.  NEURO: Cranial nerves grossly intact.  Mentation and speech appropriate for age.  PSYCH: Mentation appears normal, affect normal/bright, judgement and insight intact, normal speech and appearance well-groomed.                Video-Visit Details    Type of service:  Video Visit     Originating Location (pt. Location): Home    Distant Location (provider location):  On-site  Platform used for Video Visit: [employer]

## 2024-02-04 DIAGNOSIS — F41.1 GAD (GENERALIZED ANXIETY DISORDER): ICD-10-CM

## 2024-02-04 DIAGNOSIS — F32.81 PMDD (PREMENSTRUAL DYSPHORIC DISORDER): ICD-10-CM

## 2024-02-04 DIAGNOSIS — F33.1 MODERATE EPISODE OF RECURRENT MAJOR DEPRESSIVE DISORDER (H): ICD-10-CM

## 2024-02-05 RX ORDER — CITALOPRAM HYDROBROMIDE 20 MG/1
40 TABLET ORAL DAILY
Qty: 180 TABLET | Refills: 1 | Status: SHIPPED | OUTPATIENT
Start: 2024-02-05

## 2024-05-02 DIAGNOSIS — F33.1 MODERATE EPISODE OF RECURRENT MAJOR DEPRESSIVE DISORDER (H): ICD-10-CM

## 2024-05-02 RX ORDER — HYDROXYZINE HYDROCHLORIDE 50 MG/1
TABLET, FILM COATED ORAL
Qty: 90 TABLET | Refills: 3 | Status: SHIPPED | OUTPATIENT
Start: 2024-05-02

## 2024-10-28 ENCOUNTER — TELEPHONE (OUTPATIENT)
Dept: FAMILY MEDICINE | Facility: CLINIC | Age: 24
End: 2024-10-28

## 2024-10-28 NOTE — TELEPHONE ENCOUNTER
Patient Quality Outreach    Patient is due for the following:   Cervical Cancer Screening - PAP Needed  Physical Preventive Adult Physical      Topic Date Due    HPV Vaccine (1 - 3-dose series) Never done    Hepatitis B Vaccine (1 of 3 - 19+ 3-dose series) Never done    Diptheria Tetanus Pertussis (DTAP/TDAP/TD) Vaccine (1 - Tdap) Never done    Flu Vaccine (1) 09/01/2024    COVID-19 Vaccine (3 - 2024-25 season) 09/01/2024       Next Steps:   Schedule a Adult Preventative    Type of outreach:    Sent [employer] message.    Next Steps:  Reach out within 90 days via Letter.    Max number of attempts reached: No. Will try again in 90 days if patient still on fail list.    Questions for provider review:    None           Lui Soto

## 2025-02-13 DIAGNOSIS — F33.1 MODERATE EPISODE OF RECURRENT MAJOR DEPRESSIVE DISORDER (H): ICD-10-CM

## 2025-02-13 DIAGNOSIS — F32.81 PMDD (PREMENSTRUAL DYSPHORIC DISORDER): ICD-10-CM

## 2025-02-13 DIAGNOSIS — F41.1 GAD (GENERALIZED ANXIETY DISORDER): ICD-10-CM

## 2025-02-13 RX ORDER — CITALOPRAM HYDROBROMIDE 20 MG/1
40 TABLET ORAL DAILY
Qty: 180 TABLET | Refills: 1 | Status: SHIPPED | OUTPATIENT
Start: 2025-02-13 | End: 2025-02-13

## 2025-02-13 NOTE — TELEPHONE ENCOUNTER
Med refilled as noted.     Sade was seen today for medication refill.    Diagnoses and all orders for this visit:    PMDD (premenstrual dysphoric disorder)  -     citalopram (CELEXA) 20 MG tablet; TAKE 2 TABLETS(40 MG) BY MOUTH DAILY    Moderate episode of recurrent major depressive disorder (H)  -     citalopram (CELEXA) 20 MG tablet; TAKE 2 TABLETS(40 MG) BY MOUTH DAILY    TRAVIS (generalized anxiety disorder)  -     citalopram (CELEXA) 20 MG tablet; TAKE 2 TABLETS(40 MG) BY MOUTH DAILY      Negrito Garza MD  12:16 PM, February 13, 2025